# Patient Record
Sex: FEMALE | Race: WHITE | URBAN - METROPOLITAN AREA
[De-identification: names, ages, dates, MRNs, and addresses within clinical notes are randomized per-mention and may not be internally consistent; named-entity substitution may affect disease eponyms.]

---

## 2023-10-05 PROBLEM — M51.37 DEGENERATION OF INTERVERTEBRAL DISC OF LUMBOSACRAL REGION: Status: ACTIVE | Noted: 2023-10-05

## 2023-10-05 PROBLEM — M79.12 MYALGIA OF AUXILIARY MUSCLES, HEAD AND NECK: Status: ACTIVE | Noted: 2023-10-05

## 2023-10-05 PROBLEM — M53.9 MULTILEVEL DEGENERATIVE DISC DISEASE: Status: ACTIVE | Noted: 2023-10-05

## 2023-10-05 PROBLEM — M99.09 SEGMENTAL AND SOMATIC DYSFUNCTION: Status: ACTIVE | Noted: 2023-10-05

## 2023-10-05 PROBLEM — E78.2 MIXED HYPERLIPIDEMIA: Status: ACTIVE | Noted: 2023-10-05

## 2023-10-05 PROBLEM — M54.17 LUMBOSACRAL RADICULITIS: Status: ACTIVE | Noted: 2023-10-05

## 2023-10-05 PROBLEM — M51.379 DEGENERATION OF INTERVERTEBRAL DISC OF LUMBOSACRAL REGION: Status: ACTIVE | Noted: 2023-10-05

## 2023-10-05 RX ORDER — SPIRONOLACTONE 50 MG/1
TABLET, FILM COATED ORAL
COMMUNITY
Start: 2018-01-11

## 2023-10-05 NOTE — PROGRESS NOTES
Subjective   Patient ID: Rose Sutton is a 68 y.o. female who presents October 5, 2023 for neck and low back pain.    No limit-Medicare    Today, the patient rates their degree of pain as a 3 out of 10 on the numeric pain rating scale.     ROSE GUADARRAMA returns for continued treatment of their neck/upper back pain and left hip discomfort. Patient states that she has low back discomfort and right buttock pain that began yesterday while washing her right leg in the shower. She states that until then she had been feeling much better. She states that her stretching routine has helped reduce her symptoms. Denies any associated symptoms or change in medical history since last visit and will follow up PRN.            Objective   Physical Exam  Constitutional:       General: She is not in acute distress.     Appearance: Normal appearance.   HENT:      Head: Normocephalic and atraumatic.   Musculoskeletal:      Cervical back: Decreased range of motion.      Lumbar back: Tenderness present. Decreased range of motion.        Back:    Neurological:      General: No focal deficit present.      Mental Status: She is alert and oriented to person, place, and time.      Cranial Nerves: No dysarthria or facial asymmetry.      Sensory: Sensation is intact.      Motor: Motor function is intact.      Coordination: Coordination is intact.      Gait: Gait is intact.   Psychiatric:         Attention and Perception: Attention normal.         Mood and Affect: Mood normal.         Speech: Speech normal.         Behavior: Behavior is cooperative.         Palpation of the following region(s) revealed:  Cervical: Levator scapulae right, hypertonicity and tenderness.  Cervical paraspinals bilateral, muscular hypertonicity.  Thoracic: Thoracic paraspinals right, hypertonicity and tenderness.  Lumbar: Lumbar paraspinals bilateral, hypertonicity and tenderness.  Gluteal right, hypertonicity and tenderness.        Segmental Joint(s): Segmental joint  dysfunction was assessed with motion palpation and is identified in the following areas:  Cervical : C1 C4 C6 C7  Thoracic : T1, T4, and T8  Lumbopelvic / Sacral SIJ : L5/S1, R SIJ, and L SIJ  Upper / Lower Extremities : L Hip      Assessment/Plan   Today's Treatment Included: Chiropractic manipulation to the Segmental Joint(s) Cervical : C1 C4 C6 C7 Segmental Joint(s) Lumbopelvic/Sacral SIJ : L5/S1, R SIJ, and L SIJ Segmental Joint(s) Thoracic : T1, T4, and T8 Segmental Joints Upper/Lower Extremities : L Hip  Treatment Techniques Used : Pelvic drop table technique, Manual traction, and Low force  Massage Therapy ([id number]): Start time: 8:55 am End time: 9:10 am  1 Units    Soft-tissue mobilization was performed in the following areas:   Cervical paraspinal mm. bilateral and Levator Scap. right  Thoracic Paraspinal mm. right  Lumbar Paraspinal mm. bilateral and Gluteal mm. Glute. Max.  and Glute. Med. right            The patient tolerated today's treatment with little or no additional discomfort and was instructed to contact the office for questions or concerns.

## 2023-10-06 ENCOUNTER — ALLIED HEALTH (OUTPATIENT)
Dept: INTEGRATIVE MEDICINE | Facility: CLINIC | Age: 68
End: 2023-10-06
Payer: MEDICARE

## 2023-10-06 DIAGNOSIS — M54.2 CERVICALGIA: ICD-10-CM

## 2023-10-06 DIAGNOSIS — M99.02 SEGMENTAL AND SOMATIC DYSFUNCTION OF THORACIC REGION: ICD-10-CM

## 2023-10-06 DIAGNOSIS — M99.03 SEGMENTAL AND SOMATIC DYSFUNCTION OF LUMBAR REGION: Primary | ICD-10-CM

## 2023-10-06 DIAGNOSIS — M79.10 MYALGIA: ICD-10-CM

## 2023-10-06 DIAGNOSIS — M99.04 SEGMENTAL AND SOMATIC DYSFUNCTION OF SACRAL REGION: ICD-10-CM

## 2023-10-06 DIAGNOSIS — M54.17 LUMBOSACRAL RADICULITIS: ICD-10-CM

## 2023-10-06 DIAGNOSIS — M99.01 SEGMENTAL AND SOMATIC DYSFUNCTION OF CERVICAL REGION: ICD-10-CM

## 2023-10-06 PROCEDURE — 97124 MASSAGE THERAPY: CPT | Performed by: CHIROPRACTOR

## 2023-10-06 PROCEDURE — 98941 CHIROPRACT MANJ 3-4 REGIONS: CPT | Performed by: CHIROPRACTOR

## 2023-10-09 ENCOUNTER — TELEMEDICINE (OUTPATIENT)
Dept: INTEGRATIVE MEDICINE | Facility: CLINIC | Age: 68
End: 2023-10-09
Payer: MEDICARE

## 2023-10-09 DIAGNOSIS — M19.09 PRIMARY OSTEOARTHRITIS OF OTHER SITE: ICD-10-CM

## 2023-10-09 DIAGNOSIS — M54.59 OTHER LOW BACK PAIN: Primary | ICD-10-CM

## 2023-10-09 DIAGNOSIS — M51.37 DEGENERATION OF INTERVERTEBRAL DISC OF LUMBOSACRAL REGION: ICD-10-CM

## 2023-10-09 DIAGNOSIS — M99.09 SEGMENTAL AND SOMATIC DYSFUNCTION: ICD-10-CM

## 2023-10-09 PROCEDURE — 99202 OFFICE O/P NEW SF 15 MIN: CPT | Performed by: NURSE PRACTITIONER

## 2023-10-09 NOTE — PROGRESS NOTES
Patient presents today for a Medicare acupuncture referral visit.     PMH: other low back pain; degenerative osteoarthritis    When did the pain first start? Has gotten worse and increased in the last year.  What makes it worse? Standing, lifting, bending,. What makes it better? Moving, laying down flat. Current treatments: uses ice/heat, on occasion Advil, no physical therapy, no corticosteroid injections.     Supplementation: Tumeric, bioflex, 2000 units everyday of vit d, nordic naturals fish oil(omega3)     Patient would like an acupuncture referral.     Acupuncture explained and diagnostic codes were reviewed based on patients insurance carrier.

## 2023-10-26 ENCOUNTER — ALLIED HEALTH (OUTPATIENT)
Dept: INTEGRATIVE MEDICINE | Facility: CLINIC | Age: 68
End: 2023-10-26
Payer: MEDICARE

## 2023-10-26 DIAGNOSIS — M54.59 OTHER LOW BACK PAIN: Primary | ICD-10-CM

## 2023-10-26 DIAGNOSIS — M25.551 CHRONIC HIP PAIN, BILATERAL: ICD-10-CM

## 2023-10-26 DIAGNOSIS — M25.552 CHRONIC HIP PAIN, BILATERAL: ICD-10-CM

## 2023-10-26 DIAGNOSIS — G89.29 CHRONIC HIP PAIN, BILATERAL: ICD-10-CM

## 2023-10-26 PROCEDURE — 97810 ACUP 1/> WO ESTIM 1ST 15 MIN: CPT | Performed by: ACUPUNCTURIST

## 2023-10-26 PROCEDURE — 97811 ACUP 1/> W/O ESTIM EA ADD 15: CPT | Performed by: ACUPUNCTURIST

## 2023-10-26 NOTE — PROGRESS NOTES
Acupuncture Visit:     Subjective   Patient ID: Saumya Sutton is a 68 y.o. female who presents for other low back pain    HPI  LBP: back ain is daily R LBP, twisted down on either side is very painful.     LBP is generally 2-3/10 on a daily basis is she is resting. Notes that certain activities will aggravate her back pain, and sleeping in certain positions. Getting out of a sitting position is very painful. Pain goes up to an 8/10 3/week with certain activities, movements takes advil, ice and heat when the pain is bothering her. Notes daily hip pain as well, L hip worse than R hip.    Session Information  Is this acupuncture treatment being billed to the patient's insurance company: Yes  This is visit number: 1  The patient has a total number of visits of: 12  Initial Acupuncture Treatment date: 10/26/23  Last Treatment date: 01/26/24  Name of Insurance Company: Medicare A + B         Review of Systems         Provider reviewed plan for the acupuncture session, precautions and contraindications. Patient/guardian/hospital staff has given consent to treat with full understanding of what to expect during the session. Before acupuncture began, provider explained to the patient to communicate at any time if the procedure was causing discomfort past their tolerance level. Patient agreed to advise acupuncturist. The acupuncturist counseled the patient on the risks of acupuncture treatment including pain, infection, bleeding, and no relief of pain. The patient was positioned comfortably. There was no evidence of infection at the site of needle insertions.    Objective   Physical Exam                             Assessment/Plan

## 2023-10-30 ENCOUNTER — ALLIED HEALTH (OUTPATIENT)
Dept: INTEGRATIVE MEDICINE | Facility: CLINIC | Age: 68
End: 2023-10-30
Payer: MEDICARE

## 2023-10-30 DIAGNOSIS — M54.59 OTHER LOW BACK PAIN: Primary | ICD-10-CM

## 2023-10-30 PROCEDURE — 97811 ACUP 1/> W/O ESTIM EA ADD 15: CPT | Performed by: ACUPUNCTURIST

## 2023-10-30 PROCEDURE — 97810 ACUP 1/> WO ESTIM 1ST 15 MIN: CPT | Performed by: ACUPUNCTURIST

## 2023-10-30 NOTE — PROGRESS NOTES
Subjective   Patient ID: Saumya Sutton is a 68 y.o. female who presents for other low back pain    Session Information  Is this acupuncture treatment being billed to the patient's insurance company: Yes  This is visit number: 1  The patient has a total number of visits of: 12  Initial Acupuncture Treatment date: 10/26/23  Last Treatment date: 01/26/24  Name of Insurance Company: Medicare A + B        HPI    Notes no changes in back pain after last tx.    LBP: back ain is daily R LBP, twisted down on either side is very painful.      LBP is generally 2-3/10 on a daily basis is she is resting. Notes that certain activities will aggravate her back pain, and sleeping in certain positions. Getting out of a sitting position is very painful. Pain goes up to an 8/10 3/week with certain activities, movements takes advil, ice and heat when the pain is bothering her. Notes daily hip pain as well, L hip worse than R hip.    Review of Systems    Review of Systems         Provider reviewed plan for the acupuncture session, precautions and contraindications. Patient/guardian/hospital staff has given consent to treat with full understanding of what to expect during the session. Before acupuncture began, provider explained to the patient to communicate at any time if the procedure was causing discomfort past their tolerance level. Patient agreed to advise acupuncturist. The acupuncturist counseled the patient on the risks of acupuncture treatment including pain, infection, bleeding, and no relief of pain. The patient was positioned comfortably. There was no evidence of infection at the site of needle insertions.    Objective   Physical Exam                             Assessment/Plan

## 2023-10-31 ENCOUNTER — ALLIED HEALTH (OUTPATIENT)
Dept: INTEGRATIVE MEDICINE | Facility: CLINIC | Age: 68
End: 2023-10-31
Payer: MEDICARE

## 2023-10-31 DIAGNOSIS — M99.02 SEGMENTAL AND SOMATIC DYSFUNCTION OF THORACIC REGION: ICD-10-CM

## 2023-10-31 DIAGNOSIS — M99.04 SEGMENTAL AND SOMATIC DYSFUNCTION OF SACRAL REGION: ICD-10-CM

## 2023-10-31 DIAGNOSIS — G89.29 CHRONIC HIP PAIN, BILATERAL: ICD-10-CM

## 2023-10-31 DIAGNOSIS — M99.01 SEGMENTAL AND SOMATIC DYSFUNCTION OF CERVICAL REGION: Primary | ICD-10-CM

## 2023-10-31 DIAGNOSIS — M25.552 CHRONIC HIP PAIN, BILATERAL: ICD-10-CM

## 2023-10-31 DIAGNOSIS — M53.9 MULTILEVEL DEGENERATIVE DISC DISEASE: ICD-10-CM

## 2023-10-31 DIAGNOSIS — M25.551 CHRONIC HIP PAIN, BILATERAL: ICD-10-CM

## 2023-10-31 DIAGNOSIS — M54.2 CERVICALGIA: ICD-10-CM

## 2023-10-31 PROCEDURE — 98941 CHIROPRACT MANJ 3-4 REGIONS: CPT | Performed by: CHIROPRACTOR

## 2023-10-31 ASSESSMENT — PAIN SCALES - GENERAL: PAINLEVEL: 0-NO PAIN

## 2023-10-31 NOTE — PROGRESS NOTES
"Saumya Nicole is here today for treatment of back and TMJ pain.  Her TMJ pain felt better several days after her last treatment.  She has a \"spot\" in her low back that feels tight and restricts her movement as she is bending left.  She started acupuncture which she feels has helped, and will give it 6-8 sessions and evaluate the results. She feels stiff and tight more than pain.  "

## 2023-11-15 ENCOUNTER — ALLIED HEALTH (OUTPATIENT)
Dept: INTEGRATIVE MEDICINE | Facility: CLINIC | Age: 68
End: 2023-11-15
Payer: MEDICARE

## 2023-11-15 NOTE — PROGRESS NOTES
"Subjective   Patient ID: Saumya Sutton is a 68 y.o. female who presents today for the examination and treatment of pain involving their  chief complaint(s) of Back Pain and Temporomandibular Joint Pain.    HPI   Saumya Nicole is here today for treatment of back and TMJ pain.  Her TMJ pain felt better several days after her last treatment.  She has a \"spot\" in her low back that feels tight and restricts her movement as she is bending left.  She started acupuncture which she feels has helped, and will give it 6-8 sessions and evaluate the results. She feels stiff and tight more than pain           Review of Systems    Objective   Physical Exam                                                 Assessment/Plan   Plan:  Today's Treatment Included: Chiropractic manipulation to the Segmental Joint(s) Cervical : C5 C6 Segmental Joint(s) Lumbopelvic/Sacral SIJ : L4, L5, and L5/S1 Segmental Joint(s) Thoracic : T3, T5, T6, and T7   Treatment Techniques Used : Diversified CMT, Pelvic blocking technique, Low velocity, Low amplitude, and Other: strain counter strain  Pin and stretch  Ischemic compression  Soft-Tissue manipulation  Massage Therapy ([id number]): Start time: 1:50  pm End time: 2:05  pm  1 Units      Treatment Plan : The patient and I discussed the risks and benefits of Chiropractic Care.  Based on the patient's subjective complaints along with the examination findings, it is advised that a course of Chiropractic Treatment by initiated in the form of:   as needed for symptom management  Consent for care was given both written and orally by the patient.  The patient tolerated today's treatment with little or no additional discomfort and was instructed to contact the office for questions or concerns.   Follow up as scheduled.    "

## 2023-11-15 NOTE — PROGRESS NOTES
Acupuncture Visit:     Subjective   Patient ID: Saumya Sutton is a 68 y.o. female who presents for No chief complaint on file.    Patient ID: Saumya Sutton is a 68 y.o. female who presents for other low back pain     Session Information  Is this acupuncture treatment being billed to the patient's insurance company: Yes  The patient has a total number of visits of: 12  Initial Acupuncture Treatment date: 10/26/23  Last Treatment date: 01/26/24  Name of Insurance Company: Medicare A + B        HPI     Notes no changes in back pain after last tx.     LBP: back ain is daily R LBP, twisted down on either side is very painful.      LBP is generally 2-3/10 on a daily basis is she is resting. Notes that certain activities will aggravate her back pain, and sleeping in certain positions. Getting out of a sitting position is very painful. Pain goes up to an 8/10 3/week with certain activities, movements takes advil, ice and heat when the pain is bothering her. Notes daily hip pain as well, L hip worse than R hip.     Review of Systems     Review of Systems        Provider reviewed plan for the acupuncture session, precautions and contraindications. Patient/guardian/hospital staff has given consent to treat with full understanding of what to expect during the session. Before acupuncture began, provider explained to the patient to communicate at any time if the procedure was causing discomfort past their tolerance level. Patient agreed to advise acupuncturist. The acupuncturist counseled the patient on the risks of acupuncture treatment including pain, infection, bleeding, and no relief of pain. The patient was positioned comfortably. There was no evidence of infection at the site of needle insertions.                   Session Information  Is this acupuncture treatment being billed to the patient's insurance company: Yes  This is visit number: 3  The patient has a total number of visits of: 12  Initial Acupuncture Treatment  date: 10/26/23  Last Treatment date: 01/26/24  Name of Insurance Company: Medicare A + B 90 days ending 1/26/24  Name of Supervising Physician: Nehal Zarate  Visit Type: New patient  Review of Systems: back pain         Review of Systems         Provider reviewed plan for the acupuncture session, precautions and contraindications. Patient/guardian/hospital staff has given consent to treat with full understanding of what to expect during the session. Before acupuncture began, provider explained to the patient to communicate at any time if the procedure was causing discomfort past their tolerance level. Patient agreed to advise acupuncturist. The acupuncturist counseled the patient on the risks of acupuncture treatment including pain, infection, bleeding, and no relief of pain. The patient was positioned comfortably. There was no evidence of infection at the site of needle insertions.    Objective   Physical Exam              Acupuncture Treatment  Patient Position: Supine on a table  Acupuncture Needling: Yes  Needle Guage: 36 guage /.20/ Blue seirin, 32 guage /.25/ Purple seirin  Body Points: With retention  Body Points - Left: SP9 1.5 SP6 4 - 88.01 88.02 88.03  Body Points - Bilateral: DU24 LI4/LR3 LR8 SP10 ST36  Body Points - Right: 88.12 88.13 88.14  Electroacupuncture Used: No  Needle Count In: 21  Needle Count Out: 21  Needle Retention Time (min): 40 minutes  Total Face to Face Time (min): 25 minutes              Assessment/Plan

## 2023-11-21 ENCOUNTER — ALLIED HEALTH (OUTPATIENT)
Dept: INTEGRATIVE MEDICINE | Facility: CLINIC | Age: 68
End: 2023-11-21
Payer: MEDICARE

## 2023-11-21 DIAGNOSIS — M54.59 OTHER LOW BACK PAIN: Primary | ICD-10-CM

## 2023-11-21 PROCEDURE — 97811 ACUP 1/> W/O ESTIM EA ADD 15: CPT | Performed by: ACUPUNCTURIST

## 2023-11-21 PROCEDURE — 97810 ACUP 1/> WO ESTIM 1ST 15 MIN: CPT | Performed by: ACUPUNCTURIST

## 2023-11-21 NOTE — PROGRESS NOTES
Acupuncture Visit:     Subjective   Patient ID: Saumya Sutton is a 68 y.o. female who presents for No chief complaint on file.    Patient ID: Saumya Sutton is a 68 y.o. female who presents for other low back pain     Session Information  Is this acupuncture treatment being billed to the patient's insurance company: Yes  The patient has a total number of visits of: 12  Initial Acupuncture Treatment date: 10/26/23  Last Treatment date: 01/26/24  Name of Insurance Company: Medicare A + B        HPI     Notes no changes in back pain after last tx.     LBP: back ain is daily R LBP, twisted down on either side is very painful.      LBP is generally 2-3/10 on a daily basis is she is resting. Notes that certain activities will aggravate her back pain, and sleeping in certain positions. Getting out of a sitting position is very painful. Pain goes up to an 8/10 3/week with certain activities, movements takes advil, ice and heat when the pain is bothering her. Notes daily hip pain as well, L hip worse than R hip.     Review of Systems     Review of Systems        Provider reviewed plan for the acupuncture session, precautions and contraindications. Patient/guardian/hospital staff has given consent to treat with full understanding of what to expect during the session. Before acupuncture began, provider explained to the patient to communicate at any time if the procedure was causing discomfort past their tolerance level. Patient agreed to advise acupuncturist. The acupuncturist counseled the patient on the risks of acupuncture treatment including pain, infection, bleeding, and no relief of pain. The patient was positioned comfortably. There was no evidence of infection at the site of needle insertions.                   Session Information  Is this acupuncture treatment being billed to the patient's insurance company: Yes  This is visit number: 4  The patient has a total number of visits of: 12  Initial Acupuncture Treatment  date: 10/26/23  Last Treatment date: 01/26/24  Name of Insurance Company: Medicare A + B 90 days ending 1/26/24  Name of Supervising Physician: Nehal Zarate  Visit Type: New patient  Review of Systems: back pain         Review of Systems         Provider reviewed plan for the acupuncture session, precautions and contraindications. Patient/guardian/hospital staff has given consent to treat with full understanding of what to expect during the session. Before acupuncture began, provider explained to the patient to communicate at any time if the procedure was causing discomfort past their tolerance level. Patient agreed to advise acupuncturist. The acupuncturist counseled the patient on the risks of acupuncture treatment including pain, infection, bleeding, and no relief of pain. The patient was positioned comfortably. There was no evidence of infection at the site of needle insertions.    Objective   Physical Exam              Acupuncture Treatment  Patient Position: Lateral recumbent on a table  Acupuncture Needling: Yes  Needle Guage: 36 guage /.20/ Blue seirin, 32 guage /.25/ Purple seirin  Body Points - Left: KI3 KI6  Body Points - Bilateral: BL23 BL24 BL25 BL26 BL27 BL28  Body Points - Right: BL62 BL60 - 5 ann arm  Needle Count In: 25  Needle Count Out: 25              Assessment/Plan

## 2023-11-28 ENCOUNTER — APPOINTMENT (OUTPATIENT)
Dept: INTEGRATIVE MEDICINE | Facility: CLINIC | Age: 68
End: 2023-11-28
Payer: MEDICARE

## 2023-11-28 ENCOUNTER — APPOINTMENT (OUTPATIENT)
Dept: PHYSICAL THERAPY | Facility: CLINIC | Age: 68
End: 2023-11-28
Payer: MEDICARE

## 2023-12-05 ENCOUNTER — APPOINTMENT (OUTPATIENT)
Dept: INTEGRATIVE MEDICINE | Facility: CLINIC | Age: 68
End: 2023-12-05
Payer: MEDICARE

## 2023-12-12 ENCOUNTER — ALLIED HEALTH (OUTPATIENT)
Dept: INTEGRATIVE MEDICINE | Facility: CLINIC | Age: 68
End: 2023-12-12
Payer: MEDICARE

## 2023-12-12 DIAGNOSIS — M54.59 OTHER LOW BACK PAIN: Primary | ICD-10-CM

## 2023-12-12 PROCEDURE — 97810 ACUP 1/> WO ESTIM 1ST 15 MIN: CPT | Performed by: ACUPUNCTURIST

## 2023-12-12 PROCEDURE — 97811 ACUP 1/> W/O ESTIM EA ADD 15: CPT | Performed by: ACUPUNCTURIST

## 2023-12-12 NOTE — PROGRESS NOTES
Acupuncture Visit:     Subjective   Patient ID: Saumya Sutton is a 68 y.o. female who presents for No chief complaint on file.    Patient ID: Saumya Sutton is a 68 y.o. female who presents for other low back pain     Session Information  Is this acupuncture treatment being billed to the patient's insurance company: Yes  The patient has a total number of visits of: 12  Initial Acupuncture Treatment date: 10/26/23  Last Treatment date: 01/26/24  Name of Insurance Company: Medicare A + B        HPI  Back pain has felt much improved since her last tx, pain is reduced.    Notes no changes in back pain after last tx.     LBP: back ain is daily R LBP, twisted down on either side is very painful.      LBP is generally 2-3/10 on a daily basis is she is resting. Notes that certain activities will aggravate her back pain, and sleeping in certain positions. Getting out of a sitting position is very painful. Pain goes up to an 8/10 3/week with certain activities, movements takes advil, ice and heat when the pain is bothering her. Notes daily hip pain as well, L hip worse than R hip.     Review of Systems     Review of Systems        Provider reviewed plan for the acupuncture session, precautions and contraindications. Patient/guardian/hospital staff has given consent to treat with full understanding of what to expect during the session. Before acupuncture began, provider explained to the patient to communicate at any time if the procedure was causing discomfort past their tolerance level. Patient agreed to advise acupuncturist. The acupuncturist counseled the patient on the risks of acupuncture treatment including pain, infection, bleeding, and no relief of pain. The patient was positioned comfortably. There was no evidence of infection at the site of needle insertions.                   Session Information  Is this acupuncture treatment being billed to the patient's insurance company: Yes  This is visit number: 5  The  patient has a total number of visits of: 12  Initial Acupuncture Treatment date: 10/26/23  Last Treatment date: 01/26/24  Name of Insurance Company: Medicare A + B 90 days ending 1/26/24  Name of Supervising Physician: Nehal Zarate  Visit Type: Follow-up visit  Description of present complaint: Chronic pain  Review of Systems: back pain         Review of Systems         Provider reviewed plan for the acupuncture session, precautions and contraindications. Patient/guardian/hospital staff has given consent to treat with full understanding of what to expect during the session. Before acupuncture began, provider explained to the patient to communicate at any time if the procedure was causing discomfort past their tolerance level. Patient agreed to advise acupuncturist. The acupuncturist counseled the patient on the risks of acupuncture treatment including pain, infection, bleeding, and no relief of pain. The patient was positioned comfortably. There was no evidence of infection at the site of needle insertions.    Objective   Physical Exam              Acupuncture Treatment  Patient Position: Lateral recumbent on a table  Acupuncture Needling: Yes  Needle Guage: 32 guage /.25/ Purple seirin  Body Points: With retention  Body Points - Left: SP3 - 22.06 SI3 - ST36 GB34  Body Points - Bilateral: BL23 BL24 BL25 BL26 BL27 BL28  Body Points - Right: BL62 BL60 - 3 ann arm - DB/LG  Needle Count In: 24  Needle Count Out: 24  Needle Retention Time (min): 35 minutes  Total Face to Face Time (min): 25 minutes              Assessment/Plan

## 2023-12-19 ENCOUNTER — ALLIED HEALTH (OUTPATIENT)
Dept: INTEGRATIVE MEDICINE | Facility: CLINIC | Age: 68
End: 2023-12-19
Payer: MEDICARE

## 2023-12-19 DIAGNOSIS — M99.01 SEGMENTAL AND SOMATIC DYSFUNCTION OF CERVICAL REGION: ICD-10-CM

## 2023-12-19 DIAGNOSIS — M99.02 SEGMENTAL AND SOMATIC DYSFUNCTION OF THORACIC REGION: ICD-10-CM

## 2023-12-19 DIAGNOSIS — E78.2 MIXED HYPERLIPIDEMIA: Primary | ICD-10-CM

## 2023-12-19 DIAGNOSIS — M79.10 MYALGIA: ICD-10-CM

## 2023-12-19 DIAGNOSIS — M99.04 SEGMENTAL AND SOMATIC DYSFUNCTION OF SACRAL REGION: ICD-10-CM

## 2023-12-19 DIAGNOSIS — M53.9 MULTILEVEL DEGENERATIVE DISC DISEASE: ICD-10-CM

## 2023-12-19 DIAGNOSIS — M54.2 CERVICALGIA: ICD-10-CM

## 2023-12-19 ASSESSMENT — PAIN SCALES - GENERAL: PAINLEVEL: 2

## 2023-12-19 NOTE — PROGRESS NOTES
Saumya Nicole is here today with back and neck  pain    Patient ID: Saumya Sutton is a 68 y.o. female who presents today for the examination and treatment of pain involving their  chief complaint(s) of Back Pain and Temporomandibular Joint Pain.     HPI -   She feels stiff and tight more than pain- still finding acupuncture to be helpful                 Review of Systems- reviewed            Objective   Physical Exam              Assessment/Plan   Plan:  Today's Treatment Included: Chiropractic manipulation to the Segmental Joint(s) Cervical : C5 C6 Segmental Joint(s) Lumbopelvic/Sacral SIJ : L4, L5, and L5/S1 Segmental Joint(s) Thoracic : T3, T5, T6, and T7   Treatment Techniques Used : Diversified CMT, Pelvic blocking technique, Low velocity, Low amplitude, and Other: strain counter strain  Pin and stretch  Ischemic compression  Soft-Tissue manipulation        Treatment Plan : The patient and I discussed the risks and benefits of Chiropractic Care.  Based on the patient's subjective complaints along with the examination findings, it is advised that a course of Chiropractic Treatment by initiated in the form of:   as needed for symptom management  Consent for care was given both written and orally by the patient.  The patient tolerated today's treatment with little or no additional discomfort and was instructed to contact the office for questions or concerns.   Follow up as scheduled.    Ordered place for Lipid Panel at the patients's request.

## 2024-01-03 ENCOUNTER — APPOINTMENT (OUTPATIENT)
Dept: INTEGRATIVE MEDICINE | Facility: CLINIC | Age: 69
End: 2024-01-03
Payer: MEDICARE

## 2024-01-05 ENCOUNTER — LAB (OUTPATIENT)
Dept: LAB | Facility: LAB | Age: 69
End: 2024-01-05
Payer: MEDICARE

## 2024-01-05 DIAGNOSIS — E78.2 MIXED HYPERLIPIDEMIA: ICD-10-CM

## 2024-01-05 LAB
CHOLEST SERPL-MCNC: 263 MG/DL (ref 0–199)
CHOLESTEROL/HDL RATIO: 3.7
HDLC SERPL-MCNC: 71.2 MG/DL
LDLC SERPL CALC-MCNC: 175 MG/DL
NON HDL CHOLESTEROL: 192 MG/DL (ref 0–149)
TRIGL SERPL-MCNC: 84 MG/DL (ref 0–149)
VLDL: 17 MG/DL (ref 0–40)

## 2024-01-05 PROCEDURE — 36415 COLL VENOUS BLD VENIPUNCTURE: CPT

## 2024-01-05 PROCEDURE — 80061 LIPID PANEL: CPT

## 2024-01-06 PROBLEM — M54.2 CERVICALGIA: Status: ACTIVE | Noted: 2024-01-06

## 2024-01-06 PROBLEM — M99.04 SEGMENTAL AND SOMATIC DYSFUNCTION OF SACRAL REGION: Status: ACTIVE | Noted: 2024-01-06

## 2024-01-06 PROBLEM — M79.10 MYALGIA: Status: ACTIVE | Noted: 2024-01-06

## 2024-01-06 PROBLEM — M99.01 SEGMENTAL AND SOMATIC DYSFUNCTION OF CERVICAL REGION: Status: ACTIVE | Noted: 2024-01-06

## 2024-01-06 PROBLEM — M99.02 SEGMENTAL AND SOMATIC DYSFUNCTION OF THORACIC REGION: Status: ACTIVE | Noted: 2024-01-06

## 2024-01-10 ENCOUNTER — ALLIED HEALTH (OUTPATIENT)
Dept: INTEGRATIVE MEDICINE | Facility: CLINIC | Age: 69
End: 2024-01-10
Payer: MEDICARE

## 2024-01-10 DIAGNOSIS — M54.59 OTHER LOW BACK PAIN: Primary | ICD-10-CM

## 2024-01-10 PROCEDURE — 97811 ACUP 1/> W/O ESTIM EA ADD 15: CPT | Performed by: ACUPUNCTURIST

## 2024-01-10 PROCEDURE — 97810 ACUP 1/> WO ESTIM 1ST 15 MIN: CPT | Performed by: ACUPUNCTURIST

## 2024-01-10 NOTE — PROGRESS NOTES
Acupuncture Visit:     Subjective   Patient ID: Saumya Sutton is a 69 y.o. female who presents for No chief complaint on file.    Patient ID: Saumya Sutton is a 68 y.o. female who presents for other low back pain     Session Information  Is this acupuncture treatment being billed to the patient's insurance company: Yes  The patient has a total number of visits of: 12  Initial Acupuncture Treatment date: 10/26/23  Last Treatment date: 01/26/24  Name of Insurance Company: Medicare A + B        HPI  Back pain has felt much improved since her last tx, pain is reduced, but has spiked up the past few days again, pain has gradually came back. Notes pain is LB/sacrum hips and groin area, with the hips and groin are being the worst pain.     LBP: back ain is daily R LBP, twisted down on either side is very painful.      LBP is generally 2-3/10 on a daily basis is she is resting. Notes that certain activities will aggravate her back pain, and sleeping in certain positions. Getting out of a sitting position is very painful. Pain goes up to an 8/10 3/week with certain activities, movements takes advil, ice and heat when the pain is bothering her. Notes daily hip pain as well, L hip worse than R hip.     Review of Systems     Review of Systems        Provider reviewed plan for the acupuncture session, precautions and contraindications. Patient/guardian/hospital staff has given consent to treat with full understanding of what to expect during the session. Before acupuncture began, provider explained to the patient to communicate at any time if the procedure was causing discomfort past their tolerance level. Patient agreed to advise acupuncturist. The acupuncturist counseled the patient on the risks of acupuncture treatment including pain, infection, bleeding, and no relief of pain. The patient was positioned comfortably. There was no evidence of infection at the site of needle insertions.                   Session  Information  Is this acupuncture treatment being billed to the patient's insurance company: Yes  This is visit number: 6  The patient has a total number of visits of: 12  Initial Acupuncture Treatment date: 10/26/23  Last Treatment date: 01/26/24  Name of Insurance Company: Medicare A + B 90 days ending 1/26/24  Name of Supervising Physician: Nehal Zarate  Visit Type: Follow-up visit  Description of present complaint: Chronic pain         Review of Systems         Provider reviewed plan for the acupuncture session, precautions and contraindications. Patient/guardian/hospital staff has given consent to treat with full understanding of what to expect during the session. Before acupuncture began, provider explained to the patient to communicate at any time if the procedure was causing discomfort past their tolerance level. Patient agreed to advise acupuncturist. The acupuncturist counseled the patient on the risks of acupuncture treatment including pain, infection, bleeding, and no relief of pain. The patient was positioned comfortably. There was no evidence of infection at the site of needle insertions.    Objective   Physical Exam              Acupuncture Treatment  Patient Position: Lateral recumbent on a table  Acupuncture Needling: Yes  Needle Guage: 32 guage /.25/ Purple seirin  Body Points: With retention  Body Points - Left: SJ5 - GB34 BL60 GB41  Body Points - Bilateral: DU20 - BL17 BL18 BL23 BL25 BL26 BL27 BL28 BL29  Electroacupuncture Used: No  Needle Count In: 21  Needle Count Out: 21  Needle Retention Time (min): 30 minutes  Total Face to Face Time (min): 30 minutes              Assessment/Plan

## 2024-01-17 ENCOUNTER — APPOINTMENT (OUTPATIENT)
Dept: INTEGRATIVE MEDICINE | Facility: CLINIC | Age: 69
End: 2024-01-17
Payer: MEDICARE

## 2024-01-18 ENCOUNTER — ALLIED HEALTH (OUTPATIENT)
Dept: INTEGRATIVE MEDICINE | Facility: CLINIC | Age: 69
End: 2024-01-18
Payer: MEDICARE

## 2024-01-18 DIAGNOSIS — M54.59 OTHER LOW BACK PAIN: Primary | ICD-10-CM

## 2024-01-18 PROCEDURE — 97810 ACUP 1/> WO ESTIM 1ST 15 MIN: CPT | Performed by: ACUPUNCTURIST

## 2024-01-18 PROCEDURE — 97811 ACUP 1/> W/O ESTIM EA ADD 15: CPT | Performed by: ACUPUNCTURIST

## 2024-01-18 NOTE — PROGRESS NOTES
Acupuncture Visit:     Subjective   Patient ID: Saumya Sutton is a 69 y.o. female who presents for No chief complaint on file.    Patient ID: Saumya Sutton is a 68 y.o. female who presents for other low back pain     Session Information  Is this acupuncture treatment being billed to the patient's insurance company: Yes  The patient has a total number of visits of: 12  Initial Acupuncture Treatment date: 10/26/23  Last Treatment date: 01/26/24  Name of Insurance Company: Medicare A + B        HPI  Back pain has felt much improved since her last tx, pain is reduced, but has spiked up the past few days again, pain has gradually came back. Notes pain is LB/sacrum hips and groin area, with the hips and groin are being the worst pain.     LBP: back ain is daily R LBP, twisted down on either side is very painful.      LBP is generally 2-3/10 on a daily basis is she is resting. Notes that certain activities will aggravate her back pain, and sleeping in certain positions. Getting out of a sitting position is very painful. Pain goes up to an 8/10 3/week with certain activities, movements takes advil, ice and heat when the pain is bothering her. Notes daily hip pain as well, L hip worse than R hip.     Review of Systems     Review of Systems        Provider reviewed plan for the acupuncture session, precautions and contraindications. Patient/guardian/hospital staff has given consent to treat with full understanding of what to expect during the session. Before acupuncture began, provider explained to the patient to communicate at any time if the procedure was causing discomfort past their tolerance level. Patient agreed to advise acupuncturist. The acupuncturist counseled the patient on the risks of acupuncture treatment including pain, infection, bleeding, and no relief of pain. The patient was positioned comfortably. There was no evidence of infection at the site of needle insertions.                   Session  Information  Is this acupuncture treatment being billed to the patient's insurance company: Yes  This is visit number: 7  The patient has a total number of visits of: 12  Initial Acupuncture Treatment date: 10/26/23  Last Treatment date: 01/26/24  Name of Insurance Company: Medicare A + B 90 days ending 1/26/24  Name of Supervising Physician: Nehal Zarate  Visit Type: Follow-up visit  Description of present complaint: Chronic pain  Review of Systems: back pain         Review of Systems         Provider reviewed plan for the acupuncture session, precautions and contraindications. Patient/guardian/hospital staff has given consent to treat with full understanding of what to expect during the session. Before acupuncture began, provider explained to the patient to communicate at any time if the procedure was causing discomfort past their tolerance level. Patient agreed to advise acupuncturist. The acupuncturist counseled the patient on the risks of acupuncture treatment including pain, infection, bleeding, and no relief of pain. The patient was positioned comfortably. There was no evidence of infection at the site of needle insertions.    Objective   Physical Exam              Acupuncture Treatment  Patient Position: Lateral recumbent on a table  Acupuncture Needling: Yes  Needle Guage: 32 guage /.25/ Purple seirin  Body Points: With retention  Body Points - Left: 3 ann L  hip - GB21 GB20 1 ann ST7 - KI6 KI3  Body Points - Bilateral: DU20 BL17 BL18 BL20 BL23 BL24 BL25 BL27 BL28 BL29  Body Points - Right: BL62 BL60 ST36 GB34  Other Techniques Utilized: TDP Lamp  TDP Lamp Descripton: 4 moxa sprays - mid back/LB  Needle Count In: 32  Needle Count Out: 32  Needle Retention Time (min): 35 minutes  Total Face to Face Time (min): 30 minutes              Assessment/Plan

## 2024-01-23 ENCOUNTER — APPOINTMENT (OUTPATIENT)
Dept: INTEGRATIVE MEDICINE | Facility: CLINIC | Age: 69
End: 2024-01-23
Payer: MEDICARE

## 2024-01-24 ENCOUNTER — ALLIED HEALTH (OUTPATIENT)
Dept: INTEGRATIVE MEDICINE | Facility: CLINIC | Age: 69
End: 2024-01-24
Payer: MEDICARE

## 2024-01-24 DIAGNOSIS — M54.59 OTHER LOW BACK PAIN: Primary | ICD-10-CM

## 2024-01-24 PROCEDURE — 97810 ACUP 1/> WO ESTIM 1ST 15 MIN: CPT | Performed by: ACUPUNCTURIST

## 2024-01-24 PROCEDURE — 97811 ACUP 1/> W/O ESTIM EA ADD 15: CPT | Performed by: ACUPUNCTURIST

## 2024-01-24 NOTE — PROGRESS NOTES
Acupuncture Visit:     Subjective   Patient ID: Saumya Sutton is a 69 y.o. female who presents for No chief complaint on file.    Patient ID: Saumya Sutton is a 68 y.o. female who presents for other low back pain    LBP - much improved for 5 days post tx. Notes LBP, mid back pain and jaw pain. Overall back pain has improved with acupuncture      Session Information  Is this acupuncture treatment being billed to the patient's insurance company: Yes  The patient has a total number of visits of: 12  Initial Acupuncture Treatment date: 10/26/23  Last Treatment date: 01/26/24  Name of Insurance Company: Medicare A + B    Initial:  LBP is generally 2-3/10 on a daily basis is she is resting. Notes that certain activities will aggravate her back pain, and sleeping in certain positions. Getting out of a sitting position is very painful. Pain goes up to an 8/10 3/week with certain activities, movements takes advil, ice and heat when the pain is bothering her. Notes daily hip pain as well, L hip worse than R hip.     Provider reviewed plan for the acupuncture session, precautions and contraindications. Patient/guardian/hospital staff has given consent to treat with full understanding of what to expect during the session. Before acupuncture began, provider explained to the patient to communicate at any time if the procedure was causing discomfort past their tolerance level. Patient agreed to advise acupuncturist. The acupuncturist counseled the patient on the risks of acupuncture treatment including pain, infection, bleeding, and no relief of pain. The patient was positioned comfortably. There was no evidence of infection at the site of needle insertions.                   Session Information  Is this acupuncture treatment being billed to the patient's insurance company: Yes  This is visit number: 8  The patient has a total number of visits of: 12  Initial Acupuncture Treatment date: 10/26/23  Last Treatment date:  01/26/24  Name of Insurance Company: Medicare A + B 90 days ending 1/26/24  Name of Supervising Physician: Nehal Zarate  Visit Type: Follow-up visit  Description of present complaint: Chronic pain         Review of Systems         Provider reviewed plan for the acupuncture session, precautions and contraindications. Patient/guardian/hospital staff has given consent to treat with full understanding of what to expect during the session. Before acupuncture began, provider explained to the patient to communicate at any time if the procedure was causing discomfort past their tolerance level. Patient agreed to advise acupuncturist. The acupuncturist counseled the patient on the risks of acupuncture treatment including pain, infection, bleeding, and no relief of pain. The patient was positioned comfortably. There was no evidence of infection at the site of needle insertions.    Objective   Physical Exam              Acupuncture Treatment  Patient Position: Lateral recumbent on a table  Acupuncture Needling: Yes  Needle Guage: 32 guage /.25/ Purple seirin  Body Points: With retention  Body Points - Left: GB31 GB34 BL62 BL60 - ST5 ST6 ST7  Body Points - Bilateral: BL14 BL15 BL16 BL17 BL23 BL24 BL25 BL26 BL27 BL28  Other Techniques Utilized: TDP Lamp  TDP Lamp Descripton: 4 moxa sprays - mid back/BL 17 BL16 area  Needle Count In: 27  Needle Count Out: 27              Assessment/Plan

## 2024-01-25 ENCOUNTER — APPOINTMENT (OUTPATIENT)
Dept: INTEGRATIVE MEDICINE | Facility: CLINIC | Age: 69
End: 2024-01-25
Payer: MEDICARE

## 2024-01-30 ENCOUNTER — ALLIED HEALTH (OUTPATIENT)
Dept: INTEGRATIVE MEDICINE | Facility: CLINIC | Age: 69
End: 2024-01-30
Payer: MEDICARE

## 2024-01-30 DIAGNOSIS — M99.01 SEGMENTAL AND SOMATIC DYSFUNCTION OF CERVICAL REGION: Primary | ICD-10-CM

## 2024-01-30 DIAGNOSIS — M54.2 CERVICALGIA: ICD-10-CM

## 2024-01-30 DIAGNOSIS — M99.02 SEGMENTAL AND SOMATIC DYSFUNCTION OF THORACIC REGION: ICD-10-CM

## 2024-01-30 DIAGNOSIS — M25.551 CHRONIC HIP PAIN, BILATERAL: ICD-10-CM

## 2024-01-30 DIAGNOSIS — M25.552 CHRONIC HIP PAIN, BILATERAL: ICD-10-CM

## 2024-01-30 DIAGNOSIS — M53.9 MULTILEVEL DEGENERATIVE DISC DISEASE: ICD-10-CM

## 2024-01-30 DIAGNOSIS — M99.04 SEGMENTAL AND SOMATIC DYSFUNCTION OF SACRAL REGION: ICD-10-CM

## 2024-01-30 DIAGNOSIS — G89.29 CHRONIC HIP PAIN, BILATERAL: ICD-10-CM

## 2024-01-30 PROCEDURE — 98941 CHIROPRACT MANJ 3-4 REGIONS: CPT | Performed by: CHIROPRACTOR

## 2024-01-30 ASSESSMENT — PAIN SCALES - GENERAL: PAINLEVEL: 2

## 2024-01-30 NOTE — PROGRESS NOTES
"  Patient ID: Saumya Sutton is a 68 y.o. female who presents today for the examination and treatment of pain involving their  chief complaint(s) of Back Pain and Temporomandibular Joint Pain.     HPI -   Saumya Nicole is here today for treatment of L leg, knee pain, along with R arm pain.  She has been getting acupuncture treatments for R bicep pain, which has improved, but the pain seems to have \"moved down to the forearm\".                  Review of Systems- reviewed            Objective    hysical Exam  Neuro- Vascular appears to be intact.  The patient is alert and oriented to person, place, and time with normal speech.  Cranial nerves are intact.   Cerebellar function is intact.   Memory appears to be normal and thought process is intact.   No gait abnormalities nor antalgic posture are appreciated.  No muscular clonus noted.   Otherwise no material changes from my initial exam           Assessment/Plan   Plan:  Today's Treatment Included: Chiropractic manipulation to the Segmental Joint(s) Cervical : C5 C6 Segmental Joint(s) Lumbopelvic/Sacral SIJ : L4, L5, and L5/S1 Segmental Joint(s) Thoracic : T3, T5, T6, and T7   Treatment Techniques Used : Diversified CMT, Pelvic blocking technique, Low velocity, Low amplitude, and Other: strain counter strain  Pin and stretch  Ischemic compression  Soft-Tissue manipulation        Treatment Plan : The patient and I discussed the risks and benefits of Chiropractic Care.  Based on the patient's subjective complaints along with the examination findings, it is advised that a course of Chiropractic Treatment by initiated in the form of:   as needed for symptom management  Consent for care was given both written and orally by the patient.  The patient tolerated today's treatment with little or no additional discomfort and was instructed to contact the office for questions or concerns.   Follow up as scheduled.     "

## 2024-01-31 ENCOUNTER — ALLIED HEALTH (OUTPATIENT)
Dept: INTEGRATIVE MEDICINE | Facility: CLINIC | Age: 69
End: 2024-01-31
Payer: MEDICARE

## 2024-01-31 DIAGNOSIS — M54.59 OTHER LOW BACK PAIN: Primary | ICD-10-CM

## 2024-01-31 PROCEDURE — 97810 ACUP 1/> WO ESTIM 1ST 15 MIN: CPT | Performed by: INTERNAL MEDICINE

## 2024-01-31 PROCEDURE — 97811 ACUP 1/> W/O ESTIM EA ADD 15: CPT | Performed by: INTERNAL MEDICINE

## 2024-01-31 NOTE — PROGRESS NOTES
Acupuncture Visit:     Subjective   Patient ID: Saumya Sutton is a 69 y.o. female who presents for No chief complaint on file.    Patient ID: Saumya Sutton is a 68 y.o. female who presents for other low back pain    LBP - much improved for 5 days post tx. Notes LBP, mid back pain. States flare in R hip/down R leg post tx, from lying on R side. Chiro tx yesterday helped reduce that pain.    Session Information  Is this acupuncture treatment being billed to the patient's insurance company: Yes  The patient has a total number of visits of: 12  Initial Acupuncture Treatment date: 10/26/23  Last Treatment date: 01/26/24  Name of Insurance Company: Medicare A + B    Initial:  LBP is generally 2-3/10 on a daily basis is she is resting. Notes that certain activities will aggravate her back pain, and sleeping in certain positions. Getting out of a sitting position is very painful. Pain goes up to an 8/10 3/week with certain activities, movements takes advil, ice and heat when the pain is bothering her. Notes daily hip pain as well, L hip worse than R hip.     Provider reviewed plan for the acupuncture session, precautions and contraindications. Patient/guardian/hospital staff has given consent to treat with full understanding of what to expect during the session. Before acupuncture began, provider explained to the patient to communicate at any time if the procedure was causing discomfort past their tolerance level. Patient agreed to advise acupuncturist. The acupuncturist counseled the patient on the risks of acupuncture treatment including pain, infection, bleeding, and no relief of pain. The patient was positioned comfortably. There was no evidence of infection at the site of needle insertions.                   Session Information  Is this acupuncture treatment being billed to the patient's insurance company: Yes  This is visit number: 1  The patient has a total number of visits of: 8  Initial Acupuncture Treatment  date: 10/26/23  Name of Insurance Company: Medicare A + B 90 days ending 1/26/24  Name of Supervising Physician: Nehal Zarate  Visit Type: Follow-up visit  Description of present complaint: Chronic pain         Review of Systems         Provider reviewed plan for the acupuncture session, precautions and contraindications. Patient/guardian/hospital staff has given consent to treat with full understanding of what to expect during the session. Before acupuncture began, provider explained to the patient to communicate at any time if the procedure was causing discomfort past their tolerance level. Patient agreed to advise acupuncturist. The acupuncturist counseled the patient on the risks of acupuncture treatment including pain, infection, bleeding, and no relief of pain. The patient was positioned comfortably. There was no evidence of infection at the site of needle insertions.    Objective   Physical Exam              Acupuncture Treatment  Patient Position: Supine on a table  Acupuncture Needling: Yes  Needle Guage: 40 guage /.16/ Red seirin  Body Points: With retention  Body Points - Left: SP9 1.5 SP6 4 - 88.12 88.13 88.14  Body Points - Bilateral: DU28 - SP4 SP10  Body Points - Right: GB21 - 88.09 88.10 88.11  Other Techniques Utilized: TDP Lamp  TDP Lamp Descripton: Moxa - MT points 88.09 set  Needle Count In: 16  Needle Count Out: 16  Needle Retention Time (min): 30 minutes  Total Face to Face Time (min): 30 minutes              Assessment/Plan

## 2024-02-06 ENCOUNTER — ALLIED HEALTH (OUTPATIENT)
Dept: INTEGRATIVE MEDICINE | Facility: CLINIC | Age: 69
End: 2024-02-06
Payer: MEDICARE

## 2024-02-06 DIAGNOSIS — M54.59 OTHER LOW BACK PAIN: Primary | ICD-10-CM

## 2024-02-06 PROCEDURE — 97811 ACUP 1/> W/O ESTIM EA ADD 15: CPT | Performed by: INTERNAL MEDICINE

## 2024-02-06 PROCEDURE — 97810 ACUP 1/> WO ESTIM 1ST 15 MIN: CPT | Performed by: INTERNAL MEDICINE

## 2024-02-06 NOTE — PROGRESS NOTES
Acupuncture Visit:     Subjective   Patient ID: Saumya Sutton is a 69 y.o. female who presents for No chief complaint on file.    Patient ID: Saumya Sutton is a 68 y.o. female who presents for other low back pain    LBP - much improved for 5 days post tx. Notes LBP, mid back pain. Pain gradually came back, not as high as it was prior to tx.    Session Information  Is this acupuncture treatment being billed to the patient's insurance company: Yes  The patient has a total number of visits of: 12  Initial Acupuncture Treatment date: 10/26/23  Last Treatment date: 01/26/24  Name of Insurance Company: Medicare A + B    Initial:  LBP is generally 2-3/10 on a daily basis is she is resting. Notes that certain activities will aggravate her back pain, and sleeping in certain positions. Getting out of a sitting position is very painful. Pain goes up to an 8/10 3/week with certain activities, movements takes advil, ice and heat when the pain is bothering her. Notes daily hip pain as well, L hip worse than R hip.     Provider reviewed plan for the acupuncture session, precautions and contraindications. Patient/guardian/hospital staff has given consent to treat with full understanding of what to expect during the session. Before acupuncture began, provider explained to the patient to communicate at any time if the procedure was causing discomfort past their tolerance level. Patient agreed to advise acupuncturist. The acupuncturist counseled the patient on the risks of acupuncture treatment including pain, infection, bleeding, and no relief of pain. The patient was positioned comfortably. There was no evidence of infection at the site of needle insertions.                   Session Information  Is this acupuncture treatment being billed to the patient's insurance company: Yes  This is visit number: 2  The patient has a total number of visits of: 8  Initial Acupuncture Treatment date: 10/26/23  Last Treatment date:  01/26/24  Name of Insurance Company: Medicare A + B 90 days ending 1/26/24  Name of Supervising Physician: Nehal Zarate  Visit Type: Follow-up visit  Description of present complaint: Chronic pain         Review of Systems         Provider reviewed plan for the acupuncture session, precautions and contraindications. Patient/guardian/hospital staff has given consent to treat with full understanding of what to expect during the session. Before acupuncture began, provider explained to the patient to communicate at any time if the procedure was causing discomfort past their tolerance level. Patient agreed to advise acupuncturist. The acupuncturist counseled the patient on the risks of acupuncture treatment including pain, infection, bleeding, and no relief of pain. The patient was positioned comfortably. There was no evidence of infection at the site of needle insertions.    Objective   Physical Exam              Acupuncture Treatment  Patient Position: Supine on a table  Acupuncture Needling: Yes  Needle Guage: 36 guage /.20/ Blue seirin  Body Points: With retention  Body Points - Left: KI6 - SP9 1.5 SP6 4 - 88.12 88.13 88.14  Body Points - Bilateral: YT PC6/SP4 -  Body Points - Right: 88.09 88.10 88.11  Other Techniques Utilized: TDP Lamp  TDP Lamp Descripton: Moxa - MT points 88.09 set  Needle Count In: 16  Needle Count Out: 16  Needle Retention Time (min): 30 minutes  Total Face to Face Time (min): 30 minutes              Assessment/Plan

## 2024-02-08 ENCOUNTER — ALLIED HEALTH (OUTPATIENT)
Dept: INTEGRATIVE MEDICINE | Facility: CLINIC | Age: 69
End: 2024-02-08
Payer: MEDICARE

## 2024-02-08 DIAGNOSIS — M99.04 SEGMENTAL AND SOMATIC DYSFUNCTION OF SACRAL REGION: ICD-10-CM

## 2024-02-08 DIAGNOSIS — M54.2 CERVICALGIA: ICD-10-CM

## 2024-02-08 DIAGNOSIS — M79.10 MYALGIA: ICD-10-CM

## 2024-02-08 DIAGNOSIS — M99.01 SEGMENTAL AND SOMATIC DYSFUNCTION OF CERVICAL REGION: Primary | ICD-10-CM

## 2024-02-08 DIAGNOSIS — M53.9 MULTILEVEL DEGENERATIVE DISC DISEASE: ICD-10-CM

## 2024-02-08 DIAGNOSIS — M99.02 SEGMENTAL AND SOMATIC DYSFUNCTION OF THORACIC REGION: ICD-10-CM

## 2024-02-08 PROCEDURE — 98941 CHIROPRACT MANJ 3-4 REGIONS: CPT | Performed by: CHIROPRACTOR

## 2024-02-08 NOTE — PROGRESS NOTES
Subjective   Patient ID: Rose Sutton is a 69 y.o. female who presents February 8, 2024 for neck and low back pain.    No limit-Medicare    Today, the patient rates their degree of pain as a 4 out of 10 on the numeric pain rating scale.     ROSE GUADARRAMA returns for continued treatment of their neck/upper back pain and left hip discomfort. She states that she had a mild flare up lf right sided neck pain that started when she was reaching for something under the couch 2 days ago. She states that she has taken advil and that has helped relieve symptoms. She states that she has been having left knee pain while walking. Denies any associated symptoms or change in medical history since last visit and will follow up PRN.          Objective   Physical Exam  Constitutional:       General: She is not in acute distress.     Appearance: Normal appearance.   HENT:      Head: Normocephalic and atraumatic.   Musculoskeletal:      Cervical back: Decreased range of motion.      Lumbar back: Tenderness present. Decreased range of motion.        Back:    Neurological:      General: No focal deficit present.      Mental Status: She is alert and oriented to person, place, and time.      Cranial Nerves: No dysarthria or facial asymmetry.      Sensory: Sensation is intact.      Motor: Motor function is intact.      Coordination: Coordination is intact.      Gait: Gait is intact.   Psychiatric:         Attention and Perception: Attention normal.         Mood and Affect: Mood normal.         Speech: Speech normal.         Behavior: Behavior is cooperative.       Palpation of the following region(s) revealed:  Cervical: Levator scapulae right, hypertonicity and tenderness.  Cervical paraspinals bilateral, muscular hypertonicity.  Thoracic: Thoracic paraspinals right, hypertonicity and tenderness.  Lumbar: Lumbar paraspinals bilateral, hypertonicity and tenderness.  Gluteal right, hypertonicity and tenderness.        Segmental Joint(s):  Segmental joint dysfunction was assessed with motion palpation and is identified in the following areas:  Cervical : C1 C4 C6 C7  Thoracic : T1, T4, and T8  Lumbopelvic / Sacral SIJ : L5/S1, R SIJ, and L SIJ  Upper / Lower Extremities : L Knee      Assessment/Plan   Today's Treatment Included: Chiropractic manipulation to the Segmental Joint(s) Cervical : C1 C4 C6 C7 Segmental Joint(s) Lumbopelvic/Sacral SIJ : L5/S1, R SIJ, and L SIJ Segmental Joint(s) Thoracic : T1, T4, and T8 Segmental Joints Upper/Lower Extremities : L Knee  Treatment Techniques Used : Pelvic drop table technique, Manual traction, and Low force  Soft-Tissue manipulation    Soft-tissue mobilization was performed in the following areas:   Cervical paraspinal mm. bilateral and Levator Scap. right  Thoracic Paraspinal mm. right  Lumbar Paraspinal mm. bilateral and Gluteal mm. Glute. Max.  and Glute. Med. right            The patient tolerated today's treatment with little or no additional discomfort and was instructed to contact the office for questions or concerns.

## 2024-02-14 ENCOUNTER — ALLIED HEALTH (OUTPATIENT)
Dept: INTEGRATIVE MEDICINE | Facility: CLINIC | Age: 69
End: 2024-02-14
Payer: MEDICARE

## 2024-02-14 DIAGNOSIS — M54.59 OTHER LOW BACK PAIN: Primary | ICD-10-CM

## 2024-02-14 PROCEDURE — 97811 ACUP 1/> W/O ESTIM EA ADD 15: CPT | Performed by: INTERNAL MEDICINE

## 2024-02-14 PROCEDURE — 97810 ACUP 1/> WO ESTIM 1ST 15 MIN: CPT | Performed by: INTERNAL MEDICINE

## 2024-02-14 NOTE — PROGRESS NOTES
Acupuncture Visit:     Subjective   Patient ID: Saumya Sutton is a 69 y.o. female who presents for No chief complaint on file.    Patient ID: Saumya Sutton is a 68 y.o. female who presents for other low back pain    LBP - much improved for 5 days post tx. Notes LBP, mid back pain. Pain gradually came back, not as high as it was prior to tx.    Session Information  Is this acupuncture treatment being billed to the patient's insurance company: Yes  The patient has a total number of visits of: 12  Initial Acupuncture Treatment date: 10/26/23  Last Treatment date: 01/26/24  Name of Insurance Company: Medicare A + B    Initial:  LBP is generally 2-3/10 on a daily basis is she is resting. Notes that certain activities will aggravate her back pain, and sleeping in certain positions. Getting out of a sitting position is very painful. Pain goes up to an 8/10 3/week with certain activities, movements takes advil, ice and heat when the pain is bothering her. Notes daily hip pain as well, L hip worse than R hip.     Provider reviewed plan for the acupuncture session, precautions and contraindications. Patient/guardian/hospital staff has given consent to treat with full understanding of what to expect during the session. Before acupuncture began, provider explained to the patient to communicate at any time if the procedure was causing discomfort past their tolerance level. Patient agreed to advise acupuncturist. The acupuncturist counseled the patient on the risks of acupuncture treatment including pain, infection, bleeding, and no relief of pain. The patient was positioned comfortably. There was no evidence of infection at the site of needle insertions.                   Session Information  Is this acupuncture treatment being billed to the patient's insurance company: Yes  This is visit number: 3  The patient has a total number of visits of: 8  Initial Acupuncture Treatment date: 10/26/23  Last Treatment date:  01/26/24  Name of Insurance Company: Medicare A + B 90 days ending 1/26/24  Name of Supervising Physician: Nehal Zarate  Visit Type: Follow-up visit  Description of present complaint: Chronic pain         Review of Systems         Provider reviewed plan for the acupuncture session, precautions and contraindications. Patient/guardian/hospital staff has given consent to treat with full understanding of what to expect during the session. Before acupuncture began, provider explained to the patient to communicate at any time if the procedure was causing discomfort past their tolerance level. Patient agreed to advise acupuncturist. The acupuncturist counseled the patient on the risks of acupuncture treatment including pain, infection, bleeding, and no relief of pain. The patient was positioned comfortably. There was no evidence of infection at the site of needle insertions.    Objective   Physical Exam              Acupuncture Treatment  Needle Guage: 36 guage /.20/ Blue seirin  Body Points: With retention  Body Points - Left: KI6 - SP9 1.5 SP6 4 - 88.12 88.13 88.14  Body Points - Bilateral: SJ5 - PC6/SP4 LI4/LR3  Body Points - Right: 88.09 88.10 88.11  Other Techniques Utilized: TDP Lamp  TDP Lamp Descripton: [employer] - MT points 88.09 set  Needle Count In: 21  Needle Count Out: 21  Needle Retention Time (min): 30 minutes  Total Face to Face Time (min): 30 minutes              Assessment/Plan

## 2024-02-21 ENCOUNTER — ALLIED HEALTH (OUTPATIENT)
Dept: INTEGRATIVE MEDICINE | Facility: CLINIC | Age: 69
End: 2024-02-21
Payer: MEDICARE

## 2024-02-21 ENCOUNTER — ALLIED HEALTH (OUTPATIENT)
Dept: INTEGRATIVE MEDICINE | Facility: CLINIC | Age: 69
End: 2024-02-21

## 2024-02-21 DIAGNOSIS — M54.2 CERVICALGIA: ICD-10-CM

## 2024-02-21 DIAGNOSIS — M54.59 OTHER LOW BACK PAIN: Primary | ICD-10-CM

## 2024-02-21 DIAGNOSIS — M25.551 CHRONIC HIP PAIN, BILATERAL: ICD-10-CM

## 2024-02-21 DIAGNOSIS — M25.552 CHRONIC HIP PAIN, BILATERAL: ICD-10-CM

## 2024-02-21 DIAGNOSIS — M99.02 SEGMENTAL AND SOMATIC DYSFUNCTION OF THORACIC REGION: ICD-10-CM

## 2024-02-21 DIAGNOSIS — M53.9 MULTILEVEL DEGENERATIVE DISC DISEASE: ICD-10-CM

## 2024-02-21 DIAGNOSIS — M99.04 SEGMENTAL AND SOMATIC DYSFUNCTION OF SACRAL REGION: ICD-10-CM

## 2024-02-21 DIAGNOSIS — G89.29 CHRONIC HIP PAIN, BILATERAL: ICD-10-CM

## 2024-02-21 DIAGNOSIS — M99.01 SEGMENTAL AND SOMATIC DYSFUNCTION OF CERVICAL REGION: Primary | ICD-10-CM

## 2024-02-21 DIAGNOSIS — M79.10 MYALGIA: ICD-10-CM

## 2024-02-21 PROCEDURE — MASS60G MASSAGE 60 MINUTES: Performed by: MASSAGE THERAPIST

## 2024-02-21 PROCEDURE — 97810 ACUP 1/> WO ESTIM 1ST 15 MIN: CPT | Performed by: INTERNAL MEDICINE

## 2024-02-21 PROCEDURE — 97811 ACUP 1/> W/O ESTIM EA ADD 15: CPT | Performed by: INTERNAL MEDICINE

## 2024-02-21 PROCEDURE — 98941 CHIROPRACT MANJ 3-4 REGIONS: CPT | Performed by: CHIROPRACTOR

## 2024-02-21 NOTE — PROGRESS NOTES
Subjective   Patient ID: Rose Sutton is a 69 y.o. female who presents February 21, 2024 for neck and low back pain.    No limit-Medicare    Today, the patient rates their degree of pain as a 4 out of 10 on the numeric pain rating scale.     ROSE GUADARRAMA returns for continued treatment of their neck/upper back pain and left hip discomfort. She states that she had improvement in neck symptoms after last visit that has lasted until today. She states that she has right anterior hip pain and left knee pain. She states that she feels like she is limping more than she used to.  Denies any associated symptoms or change in medical history since last visit and will follow up PRN.            Objective   Physical Exam  Constitutional:       General: She is not in acute distress.     Appearance: Normal appearance.       HENT:      Head: Normocephalic and atraumatic.   Musculoskeletal:      Cervical back: Decreased range of motion.      Lumbar back: Tenderness present. Decreased range of motion.   Neurological:      General: No focal deficit present.      Mental Status: She is alert and oriented to person, place, and time.      Cranial Nerves: No dysarthria or facial asymmetry.      Sensory: Sensation is intact.      Motor: Motor function is intact.      Coordination: Coordination is intact.      Gait: Gait is intact.   Psychiatric:         Attention and Perception: Attention normal.         Mood and Affect: Mood normal.         Speech: Speech normal.         Behavior: Behavior is cooperative.       Palpation of the following region(s) revealed:  Cervical: Levator scapulae right, hypertonicity and tenderness.  Cervical paraspinals bilateral, muscular hypertonicity.  Thoracic: Thoracic paraspinals right, hypertonicity and tenderness.  Lumbar: Lumbar paraspinals bilateral, hypertonicity and tenderness.  Gluteal right, hypertonicity and tenderness.        Segmental Joint(s): Segmental joint dysfunction was assessed with motion  palpation and is identified in the following areas:  Cervical : C1 C4 C6 C7  Thoracic : T1, T4, and T8  Lumbopelvic / Sacral SIJ : L5/S1, R SIJ, and L SIJ  Upper / Lower Extremities : R Hip, L Hip, L Knee, and L Fibular head       Assessment/Plan   Today's Treatment Included: Chiropractic manipulation to the Segmental Joint(s) Cervical : C1 C4 C6 C7 Segmental Joint(s) Lumbopelvic/Sacral SIJ : L5/S1, R SIJ, and L SIJ Segmental Joint(s) Thoracic : T1, T4, and T8 Segmental Joints Upper/Lower Extremities : R Hip, L Hip, L Knee, and L Fibular head  Treatment Techniques Used : Pelvic drop table technique, Manual traction, and Low force  Soft-Tissue manipulation    Soft-tissue mobilization was performed in the following areas:   Cervical paraspinal mm. bilateral and Levator Scap. right  Thoracic Paraspinal mm. right  Lumbar Paraspinal mm. bilateral and Gluteal mm. Glute. Max.  and Glute. Med. right            The patient tolerated today's treatment with little or no additional discomfort and was instructed to contact the office for questions or concerns.

## 2024-02-21 NOTE — PROGRESS NOTES
Acupuncture Visit:     Subjective   Patient ID: Saumya Sutton is a 69 y.o. female who presents for No chief complaint on file.    Patient ID: Saumya Sutton is a 68 y.o. female who presents for other low back pain    LBP - much improved for 5 days post tx. Notes LBP, mid back pain. Pain gradually came back, not as high as it was prior to tx. Overall having less days with pain.    Session Information  Is this acupuncture treatment being billed to the patient's insurance company: Yes  The patient has a total number of visits of: 12  Initial Acupuncture Treatment date: 10/26/23  Last Treatment date: 01/26/24  Name of Insurance Company: Medicare A + B    Initial:  LBP is generally 2-3/10 on a daily basis is she is resting. Notes that certain activities will aggravate her back pain, and sleeping in certain positions. Getting out of a sitting position is very painful. Pain goes up to an 8/10 3/week with certain activities, movements takes advil, ice and heat when the pain is bothering her. Notes daily hip pain as well, L hip worse than R hip.     Provider reviewed plan for the acupuncture session, precautions and contraindications. Patient/guardian/hospital staff has given consent to treat with full understanding of what to expect during the session. Before acupuncture began, provider explained to the patient to communicate at any time if the procedure was causing discomfort past their tolerance level. Patient agreed to advise acupuncturist. The acupuncturist counseled the patient on the risks of acupuncture treatment including pain, infection, bleeding, and no relief of pain. The patient was positioned comfortably. There was no evidence of infection at the site of needle insertions.                   Session Information  Is this acupuncture treatment being billed to the patient's insurance company: Yes  This is visit number: 4  The patient has a total number of visits of: 8  Initial Acupuncture Treatment date:  10/26/23  Last Treatment date: 01/26/24  Name of Insurance Company: Medicare A + B 90 days ending 1/26/24  Name of Supervising Physician: Nehal Zarate  Visit Type: Follow-up visit  Description of present complaint: Chronic pain         Review of Systems         Provider reviewed plan for the acupuncture session, precautions and contraindications. Patient/guardian/hospital staff has given consent to treat with full understanding of what to expect during the session. Before acupuncture began, provider explained to the patient to communicate at any time if the procedure was causing discomfort past their tolerance level. Patient agreed to advise acupuncturist. The acupuncturist counseled the patient on the risks of acupuncture treatment including pain, infection, bleeding, and no relief of pain. The patient was positioned comfortably. There was no evidence of infection at the site of needle insertions.    Objective   Physical Exam              Acupuncture Treatment  Patient Position: Supine on a table  Acupuncture Needling: Yes  Needle Guage: 36 guage /.20/ Blue seirin  Body Points: With retention  Body Points - Left: SP9 1.5 SP6 4 - 88.12 88.13 88.14 ( last 20 min)  Body Points - Bilateral: KI6 - PC6/SP4 LI4/LR3  Body Points - Right: 88.09 88.10 88.11 - 88.01 88.02 88.03 (first 15 min)  Other Techniques Utilized: TDP Lamp  TDP Lamp Descripton: [employer] - MT points 88.09 set  Needle Count In: 23  Needle Count Out: 23  Needle Retention Time (min): 30 minutes  Total Face to Face Time (min): 30 minutes              Assessment/Plan

## 2024-02-22 NOTE — PROGRESS NOTES
Massage Therapy Visit:     Saumya Sutton was self-referred.    Condition of Client Subjective :  Patient ID: Saumya Sutton is a 69 y.o. female who presents for reason for visit of Muscle tension relief and Back Pain.  HPI    Session Information  Visit Type: New patient  Description of present complaint: Discomfort, Stress, Muscle tension, Chronic pain, Range of motion (ROM)    Review of Systems    Objective        Physical Exam    Actions Assessment/Plan :    Massage Treatment  Patient Position: Table, Supine  Positioning Assistance: Pillow(s)/bolster under knees while supine, Did not need assistance  Massage Technique: Cranio-sacral therapy, Myofascial release, Therapeutic massage, Stretching, Soft tissue mobilization  Pressure Scale: 2 - Mild pressure, 3 - Medium pressure, 4 - Moderate pressure  Action Note: 50 minute massage, supine/.  /Medium/Firm pressure.  Therapeutic pressure. MFR techniques.  Relaxation protocol.    Supine: Cervical/upper body, kneading, stripping, palming, effleurage, petrissage, cross fiber, on SCM, SITS, Levator Scapulae, Trapezius, Rhomboids, upper Pectoralis, Deltoids.  UE, stretching, cross fiber, palming, kneading, effleurage, on Deltoids, Biceps, Triceps, forearm muscles, hand muscles.  LE, kneading, stripping, palming, knuckle, effleurage, petrissage, cross fiber, stretching, on Vastus group, TFL, lateral approach to Hip Flexors, Hamstrings, Sartorius, Gracilis, Gastrocnemius, Soleus, Tibialis, peroneus.    Response:  Post-treatment Assessment  Patient Fell Asleep During Treatment: No  Patient Noted Improvement of the Following Symptoms: Muscle tension, ROM    Evaluation:   Massage Therapy Evaluation / Recommendation(s) / Follow-up  Post-Treatment Recommendation: Increase hydration, stretching, maintain movement/exercise protocol.  Follow-up: Follow up as needed, 2-4 weeks.

## 2024-02-24 PROBLEM — G89.29 CHRONIC HIP PAIN, BILATERAL: Status: ACTIVE | Noted: 2024-02-24

## 2024-02-24 PROBLEM — M25.551 CHRONIC HIP PAIN, BILATERAL: Status: ACTIVE | Noted: 2024-02-24

## 2024-02-24 PROBLEM — M25.552 CHRONIC HIP PAIN, BILATERAL: Status: ACTIVE | Noted: 2024-02-24

## 2024-02-28 ENCOUNTER — ALLIED HEALTH (OUTPATIENT)
Dept: INTEGRATIVE MEDICINE | Facility: CLINIC | Age: 69
End: 2024-02-28
Payer: MEDICARE

## 2024-02-28 ENCOUNTER — ALLIED HEALTH (OUTPATIENT)
Dept: INTEGRATIVE MEDICINE | Facility: CLINIC | Age: 69
End: 2024-02-28

## 2024-02-28 DIAGNOSIS — M54.59 OTHER LOW BACK PAIN: Primary | ICD-10-CM

## 2024-02-28 PROCEDURE — 97811 ACUP 1/> W/O ESTIM EA ADD 15: CPT | Performed by: ACUPUNCTURIST

## 2024-02-28 PROCEDURE — 97810 ACUP 1/> WO ESTIM 1ST 15 MIN: CPT | Performed by: ACUPUNCTURIST

## 2024-02-28 PROCEDURE — MASS(60M) MASSAGE 60 MINUTES: Performed by: MASSAGE THERAPIST

## 2024-02-28 NOTE — PROGRESS NOTES
Acupuncture Visit:     Subjective   Patient ID: Saumya Sutton is a 69 y.o. female who presents for No chief complaint on file.    Patient ID: Saumya Sutton is a 68 y.o. female who presents for other low back pain    LBP - much improved overall, less pain . Notes LBP, mid back pain. Overall having less days with pain.    Session Information  Is this acupuncture treatment being billed to the patient's insurance company: Yes  The patient has a total number of visits of: 12  Initial Acupuncture Treatment date: 10/26/23  Last Treatment date: 01/26/24  Name of Insurance Company: Medicare A + B    Initial:  LBP is generally 2-3/10 on a daily basis is she is resting. Notes that certain activities will aggravate her back pain, and sleeping in certain positions. Getting out of a sitting position is very painful. Pain goes up to an 8/10 3/week with certain activities, movements takes advil, ice and heat when the pain is bothering her. Notes daily hip pain as well, L hip worse than R hip.     Provider reviewed plan for the acupuncture session, precautions and contraindications. Patient/guardian/hospital staff has given consent to treat with full understanding of what to expect during the session. Before acupuncture began, provider explained to the patient to communicate at any time if the procedure was causing discomfort past their tolerance level. Patient agreed to advise acupuncturist. The acupuncturist counseled the patient on the risks of acupuncture treatment including pain, infection, bleeding, and no relief of pain. The patient was positioned comfortably. There was no evidence of infection at the site of needle insertions.                   Session Information  Is this acupuncture treatment being billed to the patient's insurance company: Yes  This is visit number: 5  The patient has a total number of visits of: 8  Initial Acupuncture Treatment date: 10/26/23  Last Treatment date: 01/26/24  Name of Insurance Company:  Medicare A + B 90 days ending 1/26/24  Name of Supervising Physician: Nehal Zarate  Visit Type: Follow-up visit  Description of present complaint: Chronic pain         Review of Systems         Provider reviewed plan for the acupuncture session, precautions and contraindications. Patient/guardian/hospital staff has given consent to treat with full understanding of what to expect during the session. Before acupuncture began, provider explained to the patient to communicate at any time if the procedure was causing discomfort past their tolerance level. Patient agreed to advise acupuncturist. The acupuncturist counseled the patient on the risks of acupuncture treatment including pain, infection, bleeding, and no relief of pain. The patient was positioned comfortably. There was no evidence of infection at the site of needle insertions.    Objective   Physical Exam              Acupuncture Treatment  Patient Position: Supine on a table  Acupuncture Needling: Yes  Needle Guage: 32 guage /.25/ Purple seirin  Body Points: With retention  Body Points - Left: SP9 1.5 SP6 4 - 88.12 88.13 88.14 ( last 20 min)  Body Points - Bilateral: KI6 - LI11 GB21 LI4/LR3 - YT DU24  Body Points - Right: 88.09 88.10 88.11  Other Techniques Utilized: TDP Lamp  TDP Lamp Descripton: [employer] - MT points 88.09 set  Needle Count In: 22  Needle Count Out: 22  Needle Retention Time (min): 30 minutes  Total Face to Face Time (min): 30 minutes              Assessment/Plan

## 2024-03-01 NOTE — PROGRESS NOTES
Massage Therapy Visit:     Saumya Sutton was self-referred.    Condition of Client Subjective :  Patient ID: Saumya Sutton is a 69 y.o. female who presents for reason for visit of Muscle tension relief.  HPI    Session Information  Visit Type: Follow-up visit  Description of present complaint: Discomfort, Range of motion (ROM), Chronic pain, Muscle tension, Stress    Review of Systems    Objective   Pre-treatment Assessment  Condition of Client Note: Focus on back muscles, hip flexors.  *REQUESTS CHAIR MASSAGE TO ACCESS POSTERIOR THORACIC, LUMBAR MUSCLES.    Physical Exam    Actions Assessment/Plan :    Massage Treatment  Patient Position: Table, Side-lying  Positioning Assistance: Pillow(s)/bolster for positioning during side-lying, Did not need assistance  Massage Technique: Therapeutic massage, Soft tissue mobilization, Relaxation massage, Myofascial release, Mobilization, Stretching  Area/Body Region: upper body, hip flexors  Pressure Scale: 2 - Mild pressure, 3 - Medium pressure, 4 - Moderate pressure  Action Note: Light/Medium/Firm pressure.  Therapeutic pressure. MFR techniques.  Relaxation protocol...   kneading, stripping, palming, knuckle, effleurage, petrissage, cross fiber, stretching, on SI joint, piriformis, gluteus group, erectors, Longissimus, QL, Obliques, paraspinals, SITS, Cervicals, Trapezius.    Response:  Post-treatment Assessment  Patient Fell Asleep During Treatment: No  Patient Noted Improvement of the Following Symptoms: Muscle tension, ROM    Evaluation:   Massage Therapy Evaluation / Recommendation(s) / Follow-up  Post-Treatment Recommendation: Increase hydration  Follow-up: Follow up as needed.

## 2024-03-05 ENCOUNTER — APPOINTMENT (OUTPATIENT)
Dept: INTEGRATIVE MEDICINE | Facility: CLINIC | Age: 69
End: 2024-03-05
Payer: MEDICARE

## 2024-03-06 ENCOUNTER — ALLIED HEALTH (OUTPATIENT)
Dept: INTEGRATIVE MEDICINE | Facility: CLINIC | Age: 69
End: 2024-03-06
Payer: MEDICARE

## 2024-03-06 DIAGNOSIS — M54.59 OTHER LOW BACK PAIN: Primary | ICD-10-CM

## 2024-03-06 PROCEDURE — 97811 ACUP 1/> W/O ESTIM EA ADD 15: CPT | Performed by: INTERNAL MEDICINE

## 2024-03-06 PROCEDURE — 97810 ACUP 1/> WO ESTIM 1ST 15 MIN: CPT | Performed by: INTERNAL MEDICINE

## 2024-03-06 PROCEDURE — MASS(60M) MASSAGE 60 MINUTES: Performed by: MASSAGE THERAPIST

## 2024-03-06 NOTE — PROGRESS NOTES
Acupuncture Visit:     Subjective   Patient ID: Saumya Sutton is a 69 y.o. female who presents for No chief complaint on file.    Patient ID: Saumya Sutton is a 68 y.o. female who presents for other low back pain    LBP - much improved overall, less pain . Notes LBP, mid back pain. Overall having less days with pain.    Session Information  Is this acupuncture treatment being billed to the patient's insurance company: Yes  The patient has a total number of visits of: 12  Initial Acupuncture Treatment date: 10/26/23  Last Treatment date: 01/26/24  Name of Insurance Company: Medicare A + B    Initial:  LBP is generally 2-3/10 on a daily basis is she is resting. Notes that certain activities will aggravate her back pain, and sleeping in certain positions. Getting out of a sitting position is very painful. Pain goes up to an 8/10 3/week with certain activities, movements takes advil, ice and heat when the pain is bothering her. Notes daily hip pain as well, L hip worse than R hip.     Provider reviewed plan for the acupuncture session, precautions and contraindications. Patient/guardian/hospital staff has given consent to treat with full understanding of what to expect during the session. Before acupuncture began, provider explained to the patient to communicate at any time if the procedure was causing discomfort past their tolerance level. Patient agreed to advise acupuncturist. The acupuncturist counseled the patient on the risks of acupuncture treatment including pain, infection, bleeding, and no relief of pain. The patient was positioned comfortably. There was no evidence of infection at the site of needle insertions.                   Session Information  Is this acupuncture treatment being billed to the patient's insurance company: Yes  This is visit number: 6  The patient has a total number of visits of: 8  Initial Acupuncture Treatment date: 10/26/23  Last Treatment date: 01/26/24  Name of Insurance Company:  Medicare A + B 90 days ending 1/26/24  Name of Supervising Physician: Nehal Zarate  Visit Type: Follow-up visit  Description of present complaint: Chronic pain         Review of Systems         Provider reviewed plan for the acupuncture session, precautions and contraindications. Patient/guardian/hospital staff has given consent to treat with full understanding of what to expect during the session. Before acupuncture began, provider explained to the patient to communicate at any time if the procedure was causing discomfort past their tolerance level. Patient agreed to advise acupuncturist. The acupuncturist counseled the patient on the risks of acupuncture treatment including pain, infection, bleeding, and no relief of pain. The patient was positioned comfortably. There was no evidence of infection at the site of needle insertions.    Objective   Physical Exam              Acupuncture Treatment  Patient Position: Supine on a table  Acupuncture Needling: Yes  Needle Guage: 36 guage /.20/ Blue seirin  Body Points: With retention  Body Points - Left: SP9 1.5 SP6 4 - 88.12 88.13 88.14  Body Points - Bilateral: CV22 KI27 LU7 KI6  Body Points - Right: 88.09 88.10 88.11  Other Techniques Utilized: TDP Lamp  TDP Lamp Descripton: [employer] - MT points 88.09 set  Needle Count In: 17  Needle Count Out: 17  Needle Retention Time (min): 30 minutes  Total Face to Face Time (min): 30 minutes              Assessment/Plan

## 2024-03-06 NOTE — PROGRESS NOTES
Massage Therapy Visit:     Saumya Sutton was self-referred.    Condition of Client Subjective :  Patient ID: Saumya Sutton is a 69 y.o. female who presents for reason for visit of Back Pain and Muscle tension relief.  HPI    Session Information  Visit Type: Follow-up visit  Description of present complaint: Discomfort, Fatigue, Range of motion (ROM), Chronic pain, Muscle tension, Stress    Review of Systems    Objective   Pre-treatment Assessment  Condition of Client Note: Chair massage requested, focus on back. Followed by table massage for legs.    Physical Exam    Actions Assessment/Plan :    Massage Treatment  Patient Position: Table, Supine, Seated  Positioning Assistance: Pillow(s)/bolster under knees while supine, Did not need assistance  Massage Technique: Therapeutic massage, Stretching, Soft tissue mobilization, Relaxation massage, Myofascial release  Pressure Scale: 2 - Mild pressure, 3 - Medium pressure, 4 - Moderate pressure    Response:  Post-treatment Assessment  Patient Fell Asleep During Treatment: No  Patient Noted Improvement of the Following Symptoms: Muscle tension, ROM    Evaluation:   Massage Therapy Evaluation / Recommendation(s) / Follow-up  Post-Treatment Recommendation: Increase hydration, stretching.  Follow-up: Follow up scheduled.

## 2024-03-12 ENCOUNTER — ALLIED HEALTH (OUTPATIENT)
Dept: INTEGRATIVE MEDICINE | Facility: CLINIC | Age: 69
End: 2024-03-12
Payer: MEDICARE

## 2024-03-12 DIAGNOSIS — M79.10 MYALGIA: Primary | ICD-10-CM

## 2024-03-12 DIAGNOSIS — M54.59 OTHER LOW BACK PAIN: Primary | ICD-10-CM

## 2024-03-12 PROCEDURE — 97810 ACUP 1/> WO ESTIM 1ST 15 MIN: CPT | Performed by: INTERNAL MEDICINE

## 2024-03-12 PROCEDURE — 97811 ACUP 1/> W/O ESTIM EA ADD 15: CPT | Performed by: INTERNAL MEDICINE

## 2024-03-12 PROCEDURE — MASSG60 MASSAGE 60 MINUTES: Performed by: MASSAGE THERAPIST

## 2024-03-12 ASSESSMENT — PAIN SCALES - GENERAL: PAINLEVEL: 1

## 2024-03-12 NOTE — PROGRESS NOTES
"Chantell is here \"feeling pretty good today\".  " [FreeTextEntry1] : 43yo female with PCOS, irregular menses and simple hyperplasia without atypia presented to my office on 4/13/23 for a consultation. I discussed with patient treatment options of Provera therapy x 3 months vs IUD vs Hysterectomy. I did not feel like hysterectomy was warranted at that time as simple hyperplasias can be managed with medical therapy alone for the most part. I advised the patient that I favored Mirena IUD given her weight issue and increased risk of hyperplasia, however, patient did not like the idea of a foreign body in her uterus. I explained to the patient that with IUD biopsies would be less frequent as well. Patient wished to proceed with Provera therapy and plan was for patient to have an EMB and US in 3 months. Patient was advised to contact me if she changed her  mind and wanted to pursue IUD. \par \par Patient has a telehealth visit today to discuss plan of care. \par

## 2024-03-12 NOTE — PROGRESS NOTES
Acupuncture Visit:     Subjective   Patient ID: Saumya Sutton is a 69 y.o. female who presents for No chief complaint on file.    Patient ID: Saumya Sutton is a 68 y.o. female who presents for other low back pain    LBP - much improved overall, less pain   Having combination of acu, massage and chiro is helping   Having much less days with pain, some days with no pain at all  Notes LBP, mid back pain  Overall having less days with pain    Session Information  Is this acupuncture treatment being billed to the patient's insurance company: Yes  The patient has a total number of visits of: 12  Initial Acupuncture Treatment date: 10/26/23  Last Treatment date: 01/26/24  Name of Insurance Company: Medicare A + B    Initial:  LBP is generally 2-3/10 on a daily basis is she is resting. Notes that certain activities will aggravate her back pain, and sleeping in certain positions. Getting out of a sitting position is very painful. Pain goes up to an 8/10 3/week with certain activities, movements takes advil, ice and heat when the pain is bothering her. Notes daily hip pain as well, L hip worse than R hip.     Provider reviewed plan for the acupuncture session, precautions and contraindications. Patient/guardian/hospital staff has given consent to treat with full understanding of what to expect during the session. Before acupuncture began, provider explained to the patient to communicate at any time if the procedure was causing discomfort past their tolerance level. Patient agreed to advise acupuncturist. The acupuncturist counseled the patient on the risks of acupuncture treatment including pain, infection, bleeding, and no relief of pain. The patient was positioned comfortably. There was no evidence of infection at the site of needle insertions.                 Session Information  Is this acupuncture treatment being billed to the patient's insurance company: Yes  This is visit number: 7  The patient has a total number  of visits of: 8  Initial Acupuncture Treatment date: 10/26/23  Last Treatment date: 01/26/24  Name of Insurance Company: Medicare A + B 90 days ending 1/26/24  Name of Supervising Physician: Nehal Zarate  Visit Type: Follow-up visit         Review of Systems         Provider reviewed plan for the acupuncture session, precautions and contraindications. Patient/guardian/hospital staff has given consent to treat with full understanding of what to expect during the session. Before acupuncture began, provider explained to the patient to communicate at any time if the procedure was causing discomfort past their tolerance level. Patient agreed to advise acupuncturist. The acupuncturist counseled the patient on the risks of acupuncture treatment including pain, infection, bleeding, and no relief of pain. The patient was positioned comfortably. There was no evidence of infection at the site of needle insertions.    Objective   Physical Exam                             Assessment/Plan

## 2024-03-14 NOTE — PROGRESS NOTES
Massage Therapy Visit:     Saumya Sutton was self-referred.    Condition of Client Subjective :  Patient ID: Saumya Sutton is a 69 y.o. female who presents for reason for visit of Muscle tension relief.  HPI    Session Information  Visit Type: Follow-up visit  Description of present complaint: Discomfort, Fatigue, Range of motion (ROM), Chronic pain, Muscle tension, Stress    Review of Systems    Objective   Pre-treatment Assessment  Condition of Client Note: Requests combination of chaitr massage and table massage.  Tension on back muscles, anterior leg muscles.    Physical Exam    Actions Assessment/Plan :    Massage Treatment  Patient Position: Table, Supine, Chair  Positioning Assistance: Pillow(s)/bolster under knees while supine, Did not need assistance  Massage Technique: Therapeutic massage, Soft tissue mobilization, Stretching, Myofascial release, Relaxation massage  Pressure Scale: 2 - Mild pressure, 3 - Medium pressure, 4 - Moderate pressure  Action Note: Chair massage, kneading, stripping, palming, knuckle, effleurage, petrissage, cross fiber, stretching, on erectors, Longissimus, QL, Obliques, paraspinals, SITS, Cervicals, Trapezius.                                               Supine: Cervical/upper body, kneading, stripping, palming, effleurage, petrissage, cross fiber, on Platysma, SCM, SITS, Levator Scapulae, Trapezius, Rhomboids, upper Pectoralis, Deltoids.  UE, stretching, cross fiber, palming, kneading, effleurage, on Deltoids, Biceps, Triceps, forearm muscles, hand muscles.  LE, kneading, stripping, palming, knuckle, effleurage, petrissage, cross fiber, stretching, on  Gastrocnemius, Soleus, Tibialis..    Response:  Post-treatment Assessment  Patient Fell Asleep During Treatment: No  Patient Noted Improvement of the Following Symptoms: Muscle tension, ROM    Evaluation:   Massage Therapy Evaluation / Recommendation(s) / Follow-up  Follow-up: Follow up scheduled.

## 2024-03-20 ENCOUNTER — ALLIED HEALTH (OUTPATIENT)
Dept: INTEGRATIVE MEDICINE | Facility: CLINIC | Age: 69
End: 2024-03-20
Payer: MEDICARE

## 2024-03-20 ENCOUNTER — ALLIED HEALTH (OUTPATIENT)
Dept: INTEGRATIVE MEDICINE | Facility: CLINIC | Age: 69
End: 2024-03-20

## 2024-03-20 DIAGNOSIS — M54.59 OTHER LOW BACK PAIN: Primary | ICD-10-CM

## 2024-03-20 PROCEDURE — 97810 ACUP 1/> WO ESTIM 1ST 15 MIN: CPT | Performed by: INTERNAL MEDICINE

## 2024-03-20 PROCEDURE — MASSG60 MASSAGE 60 MINUTES: Performed by: MASSAGE THERAPIST

## 2024-03-20 PROCEDURE — CYTAX SALES TAX CUYAHOGA COUNT: Performed by: MASSAGE THERAPIST

## 2024-03-20 PROCEDURE — 97811 ACUP 1/> W/O ESTIM EA ADD 15: CPT | Performed by: INTERNAL MEDICINE

## 2024-03-20 NOTE — PROGRESS NOTES
Acupuncture Visit:     Subjective   Patient ID: Saumya Sutton is a 69 y.o. female who presents for No chief complaint on file.    Patient ID: Saumya Sutton is a 68 y.o. female who presents for other low back pain    LBP - much improved overall, less pain   Having combination of acu, massage and chiro is helping   Having much less days with pain, some days with no pain at all  Notes LBP, mid back pain  Overall having less days with pain    Session Information  Is this acupuncture treatment being billed to the patient's insurance company: Yes  The patient has a total number of visits of: 12  Initial Acupuncture Treatment date: 10/26/23  Last Treatment date: 01/26/24  Name of Insurance Company: Medicare A + B    Initial:  LBP is generally 2-3/10 on a daily basis is she is resting. Notes that certain activities will aggravate her back pain, and sleeping in certain positions. Getting out of a sitting position is very painful. Pain goes up to an 8/10 3/week with certain activities, movements takes advil, ice and heat when the pain is bothering her. Notes daily hip pain as well, L hip worse than R hip.     Provider reviewed plan for the acupuncture session, precautions and contraindications. Patient/guardian/hospital staff has given consent to treat with full understanding of what to expect during the session. Before acupuncture began, provider explained to the patient to communicate at any time if the procedure was causing discomfort past their tolerance level. Patient agreed to advise acupuncturist. The acupuncturist counseled the patient on the risks of acupuncture treatment including pain, infection, bleeding, and no relief of pain. The patient was positioned comfortably. There was no evidence of infection at the site of needle insertions.                   Session Information  Is this acupuncture treatment being billed to the patient's insurance company: Yes  This is visit number: 8  The patient has a total number  of visits of: 8  Initial Acupuncture Treatment date: 10/26/23  Last Treatment date: 01/26/24  Name of Insurance Company: Medicare A + B 90 days ending 1/26/24  Name of Supervising Physician: Nehal Zarate  Visit Type: Follow-up visit         Review of Systems         Provider reviewed plan for the acupuncture session, precautions and contraindications. Patient/guardian/hospital staff has given consent to treat with full understanding of what to expect during the session. Before acupuncture began, provider explained to the patient to communicate at any time if the procedure was causing discomfort past their tolerance level. Patient agreed to advise acupuncturist. The acupuncturist counseled the patient on the risks of acupuncture treatment including pain, infection, bleeding, and no relief of pain. The patient was positioned comfortably. There was no evidence of infection at the site of needle insertions.    Objective   Physical Exam              Acupuncture Treatment  Patient Position: Supine on a table  Acupuncture Needling: Yes  Needle Guage: 32 guage /.25/ Purple seirin  Body Points: With retention  Body Points - Left: SP9 1.5 SP6 4 - 88.12 88.13 88.14  Body Points - Bilateral: LI4/LR3 KI27 CV22 KI3 - YT  Body Points - Right: 88.09 88.10 88.11  Other Techniques Utilized: TDP Lamp  TDP Lamp Descripton: [employer] - MT points 88.09 set  Needle Count In: 20  Needle Count Out: 20  Needle Retention Time (min): 30 minutes  Total Face to Face Time (min): 30 minutes              Assessment/Plan

## 2024-03-27 ENCOUNTER — APPOINTMENT (OUTPATIENT)
Dept: INTEGRATIVE MEDICINE | Facility: CLINIC | Age: 69
End: 2024-03-27
Payer: MEDICARE

## 2024-04-10 ENCOUNTER — ALLIED HEALTH (OUTPATIENT)
Dept: INTEGRATIVE MEDICINE | Facility: CLINIC | Age: 69
End: 2024-04-10

## 2024-04-10 DIAGNOSIS — G89.29 CHRONIC HIP PAIN, BILATERAL: Primary | ICD-10-CM

## 2024-04-10 DIAGNOSIS — M25.552 CHRONIC HIP PAIN, BILATERAL: Primary | ICD-10-CM

## 2024-04-10 DIAGNOSIS — M25.551 CHRONIC HIP PAIN, BILATERAL: Primary | ICD-10-CM

## 2024-04-10 PROCEDURE — 97139 UNLISTED THERAPEUTIC PX: CPT | Performed by: ACUPUNCTURIST

## 2024-04-10 NOTE — PROGRESS NOTES
Acupuncture Visit:     Subjective   Patient ID: Saumya Sutton is a 69 y.o. female who presents for No chief complaint on file.  Pt seeking support for bilateral hip and groin pain related to overstretching        Session Information  Is this acupuncture treatment being billed to the patient's insurance company: No  Visit Type: Follow-up visit         Review of Systems         Provider reviewed plan for the acupuncture session, precautions and contraindications. Patient/guardian/hospital staff has given consent to treat with full understanding of what to expect during the session. Before acupuncture began, provider explained to the patient to communicate at any time if the procedure was causing discomfort past their tolerance level. Patient agreed to advise acupuncturist. The acupuncturist counseled the patient on the risks of acupuncture treatment including pain, infection, bleeding, and no relief of pain. The patient was positioned comfortably. There was no evidence of infection at the site of needle insertions.    Objective   Physical Exam         Treatment Plan  Treatment Goals: Pain management    Acupuncture Treatment  Body Points - Bilateral: sp 10, 9, 3.2, gb 34, piriformis release, k 7, 10, 27  Needle Count In: 16  Needle Count Out: 16  Needle Retention Time (min): 25 minutes  Total Face to Face Time (min): 25 minutes              Assessment/Plan

## 2024-04-17 ENCOUNTER — ALLIED HEALTH (OUTPATIENT)
Dept: INTEGRATIVE MEDICINE | Facility: CLINIC | Age: 69
End: 2024-04-17
Payer: MEDICARE

## 2024-04-17 DIAGNOSIS — M79.10 MYALGIA: ICD-10-CM

## 2024-04-17 DIAGNOSIS — M99.04 SEGMENTAL AND SOMATIC DYSFUNCTION OF SACRAL REGION: ICD-10-CM

## 2024-04-17 DIAGNOSIS — M99.01 SEGMENTAL AND SOMATIC DYSFUNCTION OF CERVICAL REGION: Primary | ICD-10-CM

## 2024-04-17 DIAGNOSIS — M54.2 CERVICALGIA: ICD-10-CM

## 2024-04-17 DIAGNOSIS — M53.9 MULTILEVEL DEGENERATIVE DISC DISEASE: ICD-10-CM

## 2024-04-17 DIAGNOSIS — M99.02 SEGMENTAL AND SOMATIC DYSFUNCTION OF THORACIC REGION: ICD-10-CM

## 2024-04-17 PROCEDURE — 98941 CHIROPRACT MANJ 3-4 REGIONS: CPT | Performed by: CHIROPRACTOR

## 2024-04-17 NOTE — PROGRESS NOTES
Subjective   Patient ID: Rose Sutton is a 69 y.o. female who presents April 17, 2024 for neck and low back pain.    No limit-Medicare    Today, the patient rates their degree of pain as a 6 out of 10 on the numeric pain rating scale.     ROSE GUADARRAMA returns for continued treatment of their neck/upper back pain and left hip discomfort. She states that she has a flare up of right sided neck/upper back pain. She states that it started this morning upon waking. She states that she has been dealing with right sided anterior hip pain for about 3 weeks. She states that it started when she was doing hip opener stretches. She states that she hasn't been moving as well as she usually does. Denies any associated symptoms or change in medical history since last visit and will follow up in 1 week.            Objective   Physical Exam  Constitutional:       General: She is not in acute distress.     Appearance: Normal appearance.       HENT:      Head: Normocephalic and atraumatic.   Musculoskeletal:      Cervical back: Decreased range of motion.      Lumbar back: Tenderness present. Decreased range of motion.   Neurological:      General: No focal deficit present.      Mental Status: She is alert and oriented to person, place, and time.      Cranial Nerves: No dysarthria or facial asymmetry.      Sensory: Sensation is intact.      Motor: Motor function is intact.      Coordination: Coordination is intact.      Gait: Gait is intact.   Psychiatric:         Attention and Perception: Attention normal.         Mood and Affect: Mood normal.         Speech: Speech normal.         Behavior: Behavior is cooperative.       Palpation of the following region(s) revealed:  Cervical: Levator scapulae right, hypertonicity and tenderness.  Cervical paraspinals bilateral, muscular hypertonicity.  Thoracic: Thoracic paraspinals right, hypertonicity and tenderness.  Lumbar: Lumbar paraspinals bilateral, hypertonicity and tenderness.  Gluteal  right, hypertonicity and tenderness.        Segmental Joint(s): Segmental joint dysfunction was assessed with motion palpation and is identified in the following areas:  Cervical : C1 C4 C6 C7  Thoracic : T1, T4, and T8  Lumbopelvic / Sacral SIJ : L5/S1, R SIJ, and L SIJ  Upper / Lower Extremities : R Hip, L Hip, L Knee, and L Fibular head       Assessment/Plan   Today's Treatment Included: Chiropractic manipulation to the Segmental Joint(s) Cervical : C1 C4 C6 C7 Segmental Joint(s) Lumbopelvic/Sacral SIJ : L5/S1, R SIJ, and L SIJ Segmental Joint(s) Thoracic : T1, T4, and T8 Segmental Joints Upper/Lower Extremities : R Hip, L Hip, L Knee, and L Fibular head  Treatment Techniques Used : Pelvic drop table technique, Manual traction, and Low force  Soft-Tissue manipulation    Soft-tissue mobilization was performed in the following areas:   Cervical paraspinal mm. bilateral and Levator Scap. right  Thoracic Paraspinal mm. right  Lumbar Paraspinal mm. bilateral and Gluteal mm. Glute. Max.  and Glute. Med. right            The patient tolerated today's treatment with little or no additional discomfort and was instructed to contact the office for questions or concerns.

## 2024-04-23 ENCOUNTER — ALLIED HEALTH (OUTPATIENT)
Dept: INTEGRATIVE MEDICINE | Facility: CLINIC | Age: 69
End: 2024-04-23
Payer: MEDICARE

## 2024-04-23 ENCOUNTER — APPOINTMENT (OUTPATIENT)
Dept: INTEGRATIVE MEDICINE | Facility: CLINIC | Age: 69
End: 2024-04-23
Payer: MEDICARE

## 2024-04-23 DIAGNOSIS — M54.2 CERVICALGIA: ICD-10-CM

## 2024-04-23 DIAGNOSIS — M99.02 SEGMENTAL AND SOMATIC DYSFUNCTION OF THORACIC REGION: ICD-10-CM

## 2024-04-23 DIAGNOSIS — M54.59 OTHER LOW BACK PAIN: ICD-10-CM

## 2024-04-23 DIAGNOSIS — M99.04 SEGMENTAL AND SOMATIC DYSFUNCTION OF SACRAL REGION: ICD-10-CM

## 2024-04-23 DIAGNOSIS — M99.01 SEGMENTAL AND SOMATIC DYSFUNCTION OF CERVICAL REGION: Primary | ICD-10-CM

## 2024-04-23 DIAGNOSIS — M79.10 MYALGIA: ICD-10-CM

## 2024-04-23 DIAGNOSIS — M53.9 MULTILEVEL DEGENERATIVE DISC DISEASE: ICD-10-CM

## 2024-04-23 PROCEDURE — 98941 CHIROPRACT MANJ 3-4 REGIONS: CPT | Performed by: CHIROPRACTOR

## 2024-04-23 ASSESSMENT — PAIN SCALES - GENERAL: PAINLEVEL: 2

## 2024-04-23 NOTE — PROGRESS NOTES
HPI Saumya Nicole is her today feeling somewhat better after her last chiropractic treatment.  Patient ID: Saumya Sutton is a 68 y.o. female who presents today for the examination and treatment of pain involving their  chief complaint(s) of Back Pain and Temporomandibular Joint Pain.             Review of Systems- reviewed            Objective    hysical Exam  Neuro- Vascular appears to be intact.  The patient is alert and oriented to person, place, and time with normal speech.  Cranial nerves are intact.   Cerebellar function is intact.   Memory appears to be normal and thought process is intact.   No gait abnormalities nor antalgic posture are appreciated.  No muscular clonus noted.   Otherwise no material changes from my initial exam           Assessment/Plan   Plan:  Today's Treatment Included: Chiropractic manipulation to the Segmental Joint(s) Cervical : C5 C6 Segmental Joint(s) Lumbopelvic/Sacral SIJ : L4, L5, and L5/S1 Segmental Joint(s) Thoracic : T3, T5, T6, and T7   Treatment Techniques Used : Diversified CMT, Pelvic blocking technique, Low velocity, Low amplitude, and Other: strain counter strain  Pin and stretch  Ischemic compression  Soft-Tissue manipulation        Treatment Plan : The patient and I discussed the risks and benefits of Chiropractic Care.  Based on the patient's subjective complaints along with the examination findings, it is advised that a course of Chiropractic Treatment by initiated in the form of:   as needed for symptom management  Consent for care was given both written and orally by the patient.  The patient tolerated today's treatment with little or no additional discomfort and was instructed to contact the office for questions or concerns.   Follow up as scheduled.

## 2024-04-26 ENCOUNTER — ALLIED HEALTH (OUTPATIENT)
Dept: INTEGRATIVE MEDICINE | Facility: CLINIC | Age: 69
End: 2024-04-26
Payer: MEDICARE

## 2024-04-26 DIAGNOSIS — M53.9 MULTILEVEL DEGENERATIVE DISC DISEASE: ICD-10-CM

## 2024-04-26 DIAGNOSIS — M99.04 SEGMENTAL AND SOMATIC DYSFUNCTION OF SACRAL REGION: ICD-10-CM

## 2024-04-26 DIAGNOSIS — M79.10 MYALGIA: ICD-10-CM

## 2024-04-26 DIAGNOSIS — M25.552 CHRONIC HIP PAIN, BILATERAL: ICD-10-CM

## 2024-04-26 DIAGNOSIS — G89.29 CHRONIC HIP PAIN, BILATERAL: ICD-10-CM

## 2024-04-26 DIAGNOSIS — M99.02 SEGMENTAL AND SOMATIC DYSFUNCTION OF THORACIC REGION: ICD-10-CM

## 2024-04-26 DIAGNOSIS — M25.551 CHRONIC HIP PAIN, BILATERAL: ICD-10-CM

## 2024-04-26 DIAGNOSIS — M99.01 SEGMENTAL AND SOMATIC DYSFUNCTION OF CERVICAL REGION: Primary | ICD-10-CM

## 2024-04-26 DIAGNOSIS — M54.2 CERVICALGIA: ICD-10-CM

## 2024-04-26 PROCEDURE — 98941 CHIROPRACT MANJ 3-4 REGIONS: CPT | Performed by: CHIROPRACTOR

## 2024-04-26 NOTE — PROGRESS NOTES
Subjective   Patient ID: Rose Sutton is a 69 y.o. female who presents April 26, 2024 for neck and low back pain.    No limit-Medicare    Today, the patient rates their degree of pain as a 8 out of 10 on the numeric pain rating scale.     ROSE GUADARRAMA returns for continued treatment of their neck/upper back pain and left hip discomfort. She states that she had improvement in symptoms after her last visit. She states that she had increased low back/buttock/hip after a treatment this past Tuesday. She states that she is having difficulty walking and finding a comfortable position. She states that she has been having bilateral shin pain, but she has been kneeling and cleaning out cabinets. Denies any associated symptoms or change in medical history since last visit and will follow up in 1 week.            Objective   Physical Exam  Constitutional:       General: She is not in acute distress.     Appearance: Normal appearance.       HENT:      Head: Normocephalic and atraumatic.   Musculoskeletal:      Cervical back: Decreased range of motion.      Lumbar back: Tenderness present. Decreased range of motion.   Neurological:      General: No focal deficit present.      Mental Status: She is alert and oriented to person, place, and time.      Cranial Nerves: No dysarthria or facial asymmetry.      Sensory: Sensation is intact.      Motor: Motor function is intact.      Coordination: Coordination is intact.      Gait: Gait is intact.   Psychiatric:         Attention and Perception: Attention normal.         Mood and Affect: Mood normal.         Speech: Speech normal.         Behavior: Behavior is cooperative.       Palpation of the following region(s) revealed:  Cervical: Levator scapulae right, hypertonicity and tenderness.  Cervical paraspinals bilateral, muscular hypertonicity.  Thoracic: Thoracic paraspinals right, hypertonicity and tenderness.  Lumbar: Lumbar paraspinals bilateral, hypertonicity and tenderness.  Gluteal  right, hypertonicity and tenderness.        Segmental Joint(s): Segmental joint dysfunction was assessed with motion palpation and is identified in the following areas:  Cervical : C1 C4 C6 C7  Thoracic : T1, T4, and T8  Lumbopelvic / Sacral SIJ : L5/S1, R SIJ, and L SIJ  Upper / Lower Extremities : R Hip, L Hip, L Knee, and L Fibular head       Assessment/Plan   Today's Treatment Included: Chiropractic manipulation to the Segmental Joint(s) Cervical : C1 C4 C6 C7 Segmental Joint(s) Lumbopelvic/Sacral SIJ : L5/S1, R SIJ, and L SIJ Segmental Joint(s) Thoracic : T1, T4, and T8 Segmental Joints Upper/Lower Extremities : R Hip, L Hip, L Knee, and L Fibular head  Treatment Techniques Used : Pelvic drop table technique, Manual traction, and Low force  Soft-Tissue manipulation    Soft-tissue mobilization was performed in the following areas:   Cervical paraspinal mm. bilateral and Levator Scap. right  Thoracic Paraspinal mm. right  Lumbar Paraspinal mm. bilateral and Gluteal mm. Glute. Max.  and Glute. Med. right            The patient tolerated today's treatment with little or no additional discomfort and was instructed to contact the office for questions or concerns.

## 2024-05-01 ENCOUNTER — ALLIED HEALTH (OUTPATIENT)
Dept: INTEGRATIVE MEDICINE | Facility: CLINIC | Age: 69
End: 2024-05-01

## 2024-05-01 ENCOUNTER — APPOINTMENT (OUTPATIENT)
Dept: INTEGRATIVE MEDICINE | Facility: CLINIC | Age: 69
End: 2024-05-01
Payer: MEDICARE

## 2024-05-01 ENCOUNTER — ALLIED HEALTH (OUTPATIENT)
Dept: INTEGRATIVE MEDICINE | Facility: CLINIC | Age: 69
End: 2024-05-01
Payer: MEDICARE

## 2024-05-01 DIAGNOSIS — M54.2 CERVICALGIA: ICD-10-CM

## 2024-05-01 DIAGNOSIS — M25.552 CHRONIC HIP PAIN, BILATERAL: ICD-10-CM

## 2024-05-01 DIAGNOSIS — M99.02 SEGMENTAL AND SOMATIC DYSFUNCTION OF THORACIC REGION: ICD-10-CM

## 2024-05-01 DIAGNOSIS — M25.551 CHRONIC HIP PAIN, BILATERAL: Primary | ICD-10-CM

## 2024-05-01 DIAGNOSIS — G89.29 CHRONIC HIP PAIN, BILATERAL: ICD-10-CM

## 2024-05-01 DIAGNOSIS — M53.9 MULTILEVEL DEGENERATIVE DISC DISEASE: ICD-10-CM

## 2024-05-01 DIAGNOSIS — M79.10 MYALGIA: ICD-10-CM

## 2024-05-01 DIAGNOSIS — M99.04 SEGMENTAL AND SOMATIC DYSFUNCTION OF SACRAL REGION: ICD-10-CM

## 2024-05-01 DIAGNOSIS — G89.29 CHRONIC HIP PAIN, BILATERAL: Primary | ICD-10-CM

## 2024-05-01 DIAGNOSIS — M25.552 CHRONIC HIP PAIN, BILATERAL: Primary | ICD-10-CM

## 2024-05-01 DIAGNOSIS — M99.01 SEGMENTAL AND SOMATIC DYSFUNCTION OF CERVICAL REGION: Primary | ICD-10-CM

## 2024-05-01 DIAGNOSIS — M25.551 CHRONIC HIP PAIN, BILATERAL: ICD-10-CM

## 2024-05-01 PROCEDURE — 97139 UNLISTED THERAPEUTIC PX: CPT | Performed by: ACUPUNCTURIST

## 2024-05-01 PROCEDURE — 98941 CHIROPRACT MANJ 3-4 REGIONS: CPT | Performed by: CHIROPRACTOR

## 2024-05-01 NOTE — PROGRESS NOTES
Subjective   Patient ID: Saumya Sutton is a 69 y.o. female who presents May 1, 2024 for neck and low back pain.    No limit-Medicare    Today, the patient rates their degree of pain as a 5 out of 10 on the numeric pain rating scale.     Saumya Nicole returns for continued treatment of their neck/upper back pain and left hip discomfort. She states that she had less bilateral hip pain after last treatment for several days. She states that she started doing planks and pelvic tilts the beginning of this week and thinks that could have increased hip symptoms she has today. She states that shin pain has dissipated. She states that neck/upper back has improved. Denies any associated symptoms or change in medical history since last visit and will follow up in 1 week.            Objective   Physical Exam  Constitutional:       General: She is not in acute distress.     Appearance: Normal appearance.       HENT:      Head: Normocephalic and atraumatic.   Musculoskeletal:      Cervical back: Decreased range of motion.      Lumbar back: Tenderness present. Decreased range of motion.   Neurological:      General: No focal deficit present.      Mental Status: She is alert and oriented to person, place, and time.      Cranial Nerves: No dysarthria or facial asymmetry.      Sensory: Sensation is intact.      Motor: Motor function is intact.      Coordination: Coordination is intact.      Gait: Gait is intact.   Psychiatric:         Attention and Perception: Attention normal.         Mood and Affect: Mood normal.         Speech: Speech normal.         Behavior: Behavior is cooperative.       Palpation of the following region(s) revealed:  Cervical: Levator scapulae right, hypertonicity and tenderness.  Cervical paraspinals bilateral, muscular hypertonicity.  Thoracic: Thoracic paraspinals right, hypertonicity and tenderness.  Lumbar: Lumbar paraspinals bilateral, hypertonicity and tenderness.  Gluteal right, hypertonicity and  tenderness.        Segmental Joint(s): Segmental joint dysfunction was assessed with motion palpation and is identified in the following areas:  Cervical : C1 C4 C6 C7  Thoracic : T1, T4, and T8  Lumbopelvic / Sacral SIJ : L5/S1, R SIJ, and L SIJ  Upper / Lower Extremities : R Hip, L Hip, L Knee, and L Fibular head       Assessment/Plan   Today's Treatment Included: Chiropractic manipulation to the Segmental Joint(s) Cervical : C1 C4 C6 C7 Segmental Joint(s) Lumbopelvic/Sacral SIJ : L5/S1, R SIJ, and L SIJ Segmental Joint(s) Thoracic : T1, T4, and T8 Segmental Joints Upper/Lower Extremities : R Hip, L Hip, L Knee, and L Fibular head  Treatment Techniques Used : Pelvic drop table technique, Manual traction, and Low force  Soft-Tissue manipulation    Soft-tissue mobilization was performed in the following areas:   Cervical paraspinal mm. bilateral and Levator Scap. right  Thoracic Paraspinal mm. right  Lumbar Paraspinal mm. bilateral and Gluteal mm. Glute. Max.  and Glute. Med. right            The patient tolerated today's treatment with little or no additional discomfort and was instructed to contact the office for questions or concerns.

## 2024-05-01 NOTE — PROGRESS NOTES
Acupuncture Visit:     Subjective   Patient ID: Saumya Sutton is a 69 y.o. female who presents for No chief complaint on file.  Felt relief in bilateral hip pain post tx.  Now has back pain due to doing planks.      Previous:  Pt seeking support for bilateral hip and groin pain related to overstretching           Session Information  Is this acupuncture treatment being billed to the patient's insurance company: No  Visit Type: Follow-up visit         Review of Systems         Provider reviewed plan for the acupuncture session, precautions and contraindications. Patient/guardian/hospital staff has given consent to treat with full understanding of what to expect during the session. Before acupuncture began, provider explained to the patient to communicate at any time if the procedure was causing discomfort past their tolerance level. Patient agreed to advise acupuncturist. The acupuncturist counseled the patient on the risks of acupuncture treatment including pain, infection, bleeding, and no relief of pain. The patient was positioned comfortably. There was no evidence of infection at the site of needle insertions.    Objective   Physical Exam              Acupuncture Treatment  Body Points - Left: gb 41, k 7, 10  Body Points - Bilateral: inner yin,gb 39, 34, k27, st qix1  Body Points - Right: sj5, k 6  Needle Count In: 15  Needle Count Out: 15  Needle Retention Time (min): 25 minutes  Total Face to Face Time (min): 25 minutes              Assessment/Plan

## 2024-05-22 ENCOUNTER — ALLIED HEALTH (OUTPATIENT)
Dept: INTEGRATIVE MEDICINE | Facility: CLINIC | Age: 69
End: 2024-05-22
Payer: MEDICARE

## 2024-05-22 ENCOUNTER — APPOINTMENT (OUTPATIENT)
Dept: INTEGRATIVE MEDICINE | Facility: CLINIC | Age: 69
End: 2024-05-22
Payer: MEDICARE

## 2024-05-22 DIAGNOSIS — M99.04 SEGMENTAL AND SOMATIC DYSFUNCTION OF SACRAL REGION: ICD-10-CM

## 2024-05-22 DIAGNOSIS — R10.31 BILATERAL GROIN PAIN: Primary | ICD-10-CM

## 2024-05-22 DIAGNOSIS — M25.552 CHRONIC HIP PAIN, BILATERAL: ICD-10-CM

## 2024-05-22 DIAGNOSIS — R10.32 BILATERAL GROIN PAIN: Primary | ICD-10-CM

## 2024-05-22 DIAGNOSIS — M25.551 CHRONIC HIP PAIN, BILATERAL: ICD-10-CM

## 2024-05-22 DIAGNOSIS — M99.02 SEGMENTAL AND SOMATIC DYSFUNCTION OF THORACIC REGION: ICD-10-CM

## 2024-05-22 DIAGNOSIS — M99.01 SEGMENTAL AND SOMATIC DYSFUNCTION OF CERVICAL REGION: Primary | ICD-10-CM

## 2024-05-22 DIAGNOSIS — M79.10 MYALGIA: ICD-10-CM

## 2024-05-22 DIAGNOSIS — M53.9 MULTILEVEL DEGENERATIVE DISC DISEASE: ICD-10-CM

## 2024-05-22 DIAGNOSIS — M54.2 CERVICALGIA: ICD-10-CM

## 2024-05-22 DIAGNOSIS — G89.29 CHRONIC HIP PAIN, BILATERAL: ICD-10-CM

## 2024-05-22 PROCEDURE — 98941 CHIROPRACT MANJ 3-4 REGIONS: CPT | Performed by: CHIROPRACTOR

## 2024-05-22 PROCEDURE — 97139 UNLISTED THERAPEUTIC PX: CPT | Performed by: ACUPUNCTURIST

## 2024-05-22 NOTE — PROGRESS NOTES
Subjective   Patient ID: Saumya Sutton is a 69 y.o. female who presents May 22, 2024 for neck and low back pain.    No limit-Medicare    Today, the patient rates their degree of pain as a 2 out of 10 on the numeric pain rating scale.     Saumya Nicole returns for continued treatment of their neck/upper back pain and left hip discomfort. She states that she is doing well today. She states that she currently does not have any pain. She states that she has mostly had occasional anterior pelvic pain since last visit. She states that she was having pain this morning but after sitting for 10 minutes, symptoms dissipated. Denies any associated symptoms or change in medical history since last visit and will follow up in 1 week.            Objective   Physical Exam  Constitutional:       General: She is not in acute distress.     Appearance: Normal appearance.       HENT:      Head: Normocephalic and atraumatic.   Musculoskeletal:      Cervical back: Decreased range of motion.      Lumbar back: Tenderness present. Decreased range of motion.   Neurological:      General: No focal deficit present.      Mental Status: She is alert and oriented to person, place, and time.      Cranial Nerves: No dysarthria or facial asymmetry.      Sensory: Sensation is intact.      Motor: Motor function is intact.      Coordination: Coordination is intact.      Gait: Gait is intact.   Psychiatric:         Attention and Perception: Attention normal.         Mood and Affect: Mood normal.         Speech: Speech normal.         Behavior: Behavior is cooperative.       Palpation of the following region(s) revealed:  Cervical: Levator scapulae right, hypertonicity and tenderness.  Cervical paraspinals bilateral, muscular hypertonicity.  Thoracic: Thoracic paraspinals right, hypertonicity and tenderness.  Lumbar: Lumbar paraspinals bilateral, hypertonicity and tenderness.  Gluteal right, hypertonicity and tenderness.        Segmental Joint(s): Segmental  joint dysfunction was assessed with motion palpation and is identified in the following areas:  Cervical : C1 C4 C6 C7  Thoracic : T1, T4, and T8  Lumbopelvic / Sacral SIJ : L5/S1, R SIJ, and L SIJ  Upper / Lower Extremities : R Hip, L Hip, L Knee, and L Fibular head       Assessment/Plan   Today's Treatment Included: Chiropractic manipulation to the Segmental Joint(s) Cervical : C1 C4 C6 C7 Segmental Joint(s) Lumbopelvic/Sacral SIJ : L5/S1, R SIJ, and L SIJ Segmental Joint(s) Thoracic : T1, T4, and T8 Segmental Joints Upper/Lower Extremities : R Hip, L Hip, L Knee, and L Fibular head  Treatment Techniques Used : Pelvic drop table technique, Manual traction, and Low force  Soft-Tissue manipulation    Soft-tissue mobilization was performed in the following areas:   Cervical paraspinal mm. bilateral and Levator Scap. right  Thoracic Paraspinal mm. right  Lumbar Paraspinal mm. bilateral and Gluteal mm. Glute. Max.  and Glute. Med. right            The patient tolerated today's treatment with little or no additional discomfort and was instructed to contact the office for questions or concerns.

## 2024-05-22 NOTE — PROGRESS NOTES
Acupuncture Visit:     Subjective   Patient ID: Saumya Sutton is a 69 y.o. female who presents for No chief complaint on file.  Seeking continued support for groin pain.  She shared discomfort is decreasing but there still is underlying tightness        Session Information  Is this acupuncture treatment being billed to the patient's insurance company: No  Visit Type: Follow-up visit         Review of Systems         Provider reviewed plan for the acupuncture session, precautions and contraindications. Patient/guardian/hospital staff has given consent to treat with full understanding of what to expect during the session. Before acupuncture began, provider explained to the patient to communicate at any time if the procedure was causing discomfort past their tolerance level. Patient agreed to advise acupuncturist. The acupuncturist counseled the patient on the risks of acupuncture treatment including pain, infection, bleeding, and no relief of pain. The patient was positioned comfortably. There was no evidence of infection at the site of needle insertions.    Objective   Physical Exam              Acupuncture Treatment  Patient Position: Seated and reclining  Needle Guage: 40 guage /.16/ Red seirin, 36 guage /.20/ Blue seirin  Body Points - Left: ruddy 5 lr 4  Body Points - Bilateral: sp 9, st qi x2 inner yin adductor  Body Points - Right: immunex1  Needle Count In: 13  Needle Count Out: 13  Needle Retention Time (min): 25 minutes  Total Face to Face Time (min): 25 minutes              Assessment/Plan

## 2024-06-04 ENCOUNTER — APPOINTMENT (OUTPATIENT)
Dept: INTEGRATIVE MEDICINE | Facility: CLINIC | Age: 69
End: 2024-06-04
Payer: MEDICARE

## 2024-06-19 ENCOUNTER — APPOINTMENT (OUTPATIENT)
Dept: INTEGRATIVE MEDICINE | Facility: CLINIC | Age: 69
End: 2024-06-19
Payer: MEDICARE

## 2024-06-19 ENCOUNTER — APPOINTMENT (OUTPATIENT)
Dept: INTEGRATIVE MEDICINE | Facility: CLINIC | Age: 69
End: 2024-06-19

## 2024-06-19 DIAGNOSIS — M79.604 LEG PAIN, BILATERAL: Primary | ICD-10-CM

## 2024-06-19 DIAGNOSIS — M54.2 CERVICALGIA: ICD-10-CM

## 2024-06-19 DIAGNOSIS — M25.551 CHRONIC HIP PAIN, BILATERAL: ICD-10-CM

## 2024-06-19 DIAGNOSIS — M53.9 MULTILEVEL DEGENERATIVE DISC DISEASE: ICD-10-CM

## 2024-06-19 DIAGNOSIS — M99.04 SEGMENTAL AND SOMATIC DYSFUNCTION OF SACRAL REGION: ICD-10-CM

## 2024-06-19 DIAGNOSIS — M79.605 LEG PAIN, BILATERAL: Primary | ICD-10-CM

## 2024-06-19 DIAGNOSIS — M79.10 MYALGIA: ICD-10-CM

## 2024-06-19 DIAGNOSIS — M99.02 SEGMENTAL AND SOMATIC DYSFUNCTION OF THORACIC REGION: ICD-10-CM

## 2024-06-19 DIAGNOSIS — M25.552 CHRONIC HIP PAIN, BILATERAL: ICD-10-CM

## 2024-06-19 DIAGNOSIS — M99.01 SEGMENTAL AND SOMATIC DYSFUNCTION OF CERVICAL REGION: Primary | ICD-10-CM

## 2024-06-19 DIAGNOSIS — G89.29 CHRONIC HIP PAIN, BILATERAL: ICD-10-CM

## 2024-06-19 PROCEDURE — 97139 UNLISTED THERAPEUTIC PX: CPT | Performed by: ACUPUNCTURIST

## 2024-06-19 PROCEDURE — 98941 CHIROPRACT MANJ 3-4 REGIONS: CPT | Performed by: CHIROPRACTOR

## 2024-06-19 NOTE — PROGRESS NOTES
Acupuncture Visit:     Subjective   Patient ID: Saumya Sutton is a 69 y.o. female who presents for No chief complaint on file.  Pt being seen post chiropractic tx.  She is seeking support for bilateral lateral leg pain.          Session Information  Is this acupuncture treatment being billed to the patient's insurance company: No  Visit Type: Follow-up visit         Review of Systems         Provider reviewed plan for the acupuncture session, precautions and contraindications. Patient/guardian/hospital staff has given consent to treat with full understanding of what to expect during the session. Before acupuncture began, provider explained to the patient to communicate at any time if the procedure was causing discomfort past their tolerance level. Patient agreed to advise acupuncturist. The acupuncturist counseled the patient on the risks of acupuncture treatment including pain, infection, bleeding, and no relief of pain. The patient was positioned comfortably. There was no evidence of infection at the site of needle insertions.    Objective   Physical Exam         Treatment Plan  Treatment Goals: Pain management    Acupuncture Treatment  Patient Position: Seated and reclining  Needle Guage: 40 guage /.16/ Red seirin, 36 guage /.20/ Blue seirin, 42 guage /.14/ Lime green seirin  Body Points - Bilateral: sp 10, 3.2, st qi x1, gb 33, 34, 38, ,piriformis release  Needle Count In: 14  Needle Count Out: 14  Needle Retention Time (min): 25 minutes  Total Face to Face Time (min): 25 minutes              Assessment/Plan

## 2024-06-19 NOTE — PROGRESS NOTES
Subjective   Patient ID: Saumya Sutton is a 69 y.o. female who presents June 19, 2024 for neck and low back pain.    No limit-Medicare    Today, the patient rates their degree of pain as a 2 out of 10 on the numeric pain rating scale.     Saumya Nicole returns for continued treatment of their neck/upper back pain and left hip discomfort. She states that she has been doing better. She has had less hip and low back pain, as well as neck and mid back discomfort. She states that she has been having bilateral lateral calf pain, mostly when she is lying down in bed at night. Denies any associated symptoms or change in medical history since last visit and will follow up in 1 week.            Objective   Physical Exam  Constitutional:       General: She is not in acute distress.     Appearance: Normal appearance.       HENT:      Head: Normocephalic and atraumatic.   Musculoskeletal:      Cervical back: Decreased range of motion.      Lumbar back: Tenderness present. Decreased range of motion.   Neurological:      General: No focal deficit present.      Mental Status: She is alert and oriented to person, place, and time.      Cranial Nerves: No dysarthria or facial asymmetry.      Sensory: Sensation is intact.      Motor: Motor function is intact.      Coordination: Coordination is intact.      Gait: Gait is intact.   Psychiatric:         Attention and Perception: Attention normal.         Mood and Affect: Mood normal.         Speech: Speech normal.         Behavior: Behavior is cooperative.         Palpation of the following region(s) revealed:  Cervical: Levator scapulae right, hypertonicity and tenderness.  Cervical paraspinals bilateral, muscular hypertonicity.  Thoracic: Thoracic paraspinals right, hypertonicity and tenderness.  Lumbar: Lumbar paraspinals bilateral, hypertonicity and tenderness.  Gluteal right, hypertonicity and tenderness.        Segmental Joint(s): Segmental joint dysfunction was assessed with motion  palpation and is identified in the following areas:  Cervical : C1 C4 C6 C7  Thoracic : T1, T4, and T8  Lumbopelvic / Sacral SIJ : L5/S1, R SIJ, and L SIJ  Upper / Lower Extremities : R Hip, L Hip, R Fibular head, and L Fibular head       Assessment/Plan   Today's Treatment Included: Chiropractic manipulation to the Segmental Joint(s) Cervical : C1 C4 C6 C7 Segmental Joint(s) Lumbopelvic/Sacral SIJ : L5/S1, R SIJ, and L SIJ Segmental Joint(s) Thoracic : T1, T4, and T8 Segmental Joints Upper/Lower Extremities : R Hip, L Hip, R Fibular head, and L Fibular head  Treatment Techniques Used : Pelvic drop table technique, Manual traction, and Low force  Soft-Tissue manipulation    Soft-tissue mobilization was performed in the following areas:   Cervical paraspinal mm. bilateral and Levator Scap. right  Thoracic Paraspinal mm. right  Lumbar Paraspinal mm. bilateral and Gluteal mm. Glute. Max.  and Glute. Med. right            The patient tolerated today's treatment with little or no additional discomfort and was instructed to contact the office for questions or concerns.    no

## 2024-06-30 PROBLEM — M54.59 OTHER LOW BACK PAIN: Status: ACTIVE | Noted: 2024-06-30

## 2024-07-16 ENCOUNTER — APPOINTMENT (OUTPATIENT)
Dept: INTEGRATIVE MEDICINE | Facility: CLINIC | Age: 69
End: 2024-07-16
Payer: MEDICARE

## 2024-07-17 ENCOUNTER — APPOINTMENT (OUTPATIENT)
Dept: INTEGRATIVE MEDICINE | Facility: CLINIC | Age: 69
End: 2024-07-17
Payer: MEDICARE

## 2024-07-17 ENCOUNTER — APPOINTMENT (OUTPATIENT)
Dept: INTEGRATIVE MEDICINE | Facility: CLINIC | Age: 69
End: 2024-07-17

## 2024-07-17 DIAGNOSIS — M99.04 SEGMENTAL AND SOMATIC DYSFUNCTION OF SACRAL REGION: ICD-10-CM

## 2024-07-17 DIAGNOSIS — M99.02 SEGMENTAL AND SOMATIC DYSFUNCTION OF THORACIC REGION: ICD-10-CM

## 2024-07-17 DIAGNOSIS — M25.551 CHRONIC HIP PAIN, BILATERAL: ICD-10-CM

## 2024-07-17 DIAGNOSIS — M54.2 CERVICALGIA: ICD-10-CM

## 2024-07-17 DIAGNOSIS — M99.01 SEGMENTAL AND SOMATIC DYSFUNCTION OF CERVICAL REGION: Primary | ICD-10-CM

## 2024-07-17 DIAGNOSIS — M25.551 CHRONIC HIP PAIN, BILATERAL: Primary | ICD-10-CM

## 2024-07-17 DIAGNOSIS — M25.552 CHRONIC HIP PAIN, BILATERAL: Primary | ICD-10-CM

## 2024-07-17 DIAGNOSIS — M99.03 SEGMENTAL AND SOMATIC DYSFUNCTION OF LUMBAR REGION: ICD-10-CM

## 2024-07-17 DIAGNOSIS — G89.29 CHRONIC HIP PAIN, BILATERAL: Primary | ICD-10-CM

## 2024-07-17 DIAGNOSIS — G89.29 CHRONIC HIP PAIN, BILATERAL: ICD-10-CM

## 2024-07-17 DIAGNOSIS — M79.10 MYALGIA: ICD-10-CM

## 2024-07-17 DIAGNOSIS — M53.9 MULTILEVEL DEGENERATIVE DISC DISEASE: ICD-10-CM

## 2024-07-17 DIAGNOSIS — M25.552 CHRONIC HIP PAIN, BILATERAL: ICD-10-CM

## 2024-07-17 PROCEDURE — 97139 UNLISTED THERAPEUTIC PX: CPT | Performed by: ACUPUNCTURIST

## 2024-07-17 PROCEDURE — 98941 CHIROPRACT MANJ 3-4 REGIONS: CPT | Performed by: CHIROPRACTOR

## 2024-07-17 NOTE — PROGRESS NOTES
Subjective   Patient ID: Saumya Sutton is a 69 y.o. female who presents July 17, 2024 for neck and low back pain.    No limit-Medicare    Today, the patient rates their degree of pain as a 1 out of 10 on the numeric pain rating scale.     Saumya Nicole returns for continued treatment of their neck/upper back pain and left hip discomfort. She states that she has been well. She states that she sold her old house and that has reduced a significant amount of stress. She states that she stopped taking the statin and her bilateral calf pain has dissipated. She states that recently she has the most discomfort while standing for extended periods of time. Denies any associated symptoms or change in medical history since last visit and will follow up in 1 month.            Objective   Physical Exam  Constitutional:       General: She is not in acute distress.     Appearance: Normal appearance.       HENT:      Head: Normocephalic and atraumatic.   Musculoskeletal:      Cervical back: Decreased range of motion.      Lumbar back: Tenderness present. Decreased range of motion.   Neurological:      General: No focal deficit present.      Mental Status: She is alert and oriented to person, place, and time.      Cranial Nerves: No dysarthria or facial asymmetry.      Sensory: Sensation is intact.      Motor: Motor function is intact.      Coordination: Coordination is intact.      Gait: Gait is intact.   Psychiatric:         Attention and Perception: Attention normal.         Mood and Affect: Mood normal.         Speech: Speech normal.         Behavior: Behavior is cooperative.       Palpation of the following region(s) revealed:  Cervical: Levator scapulae right, hypertonicity and tenderness.  Cervical paraspinals bilateral, muscular hypertonicity.  Thoracic: Thoracic paraspinals right, hypertonicity and tenderness.  Lumbar: Lumbar paraspinals bilateral, hypertonicity and tenderness.  Gluteal right, hypertonicity and  tenderness.        Segmental Joint(s): Segmental joint dysfunction was assessed with motion palpation and is identified in the following areas:  Cervical : C1 C4 C6 C7  Thoracic : T1, T4, and T8  Lumbopelvic / Sacral SIJ : L5/S1, R SIJ, and L SIJ  Upper / Lower Extremities : R Hip, L Hip, R Fibular head, and L Fibular head       Assessment/Plan   Today's Treatment Included: Chiropractic manipulation to the Segmental Joint(s) Cervical : C1 C4 C6 C7 Segmental Joint(s) Lumbopelvic/Sacral SIJ : L5/S1, R SIJ, and L SIJ Segmental Joint(s) Thoracic : T1, T4, and T8 Segmental Joints Upper/Lower Extremities : R Hip, L Hip, R Fibular head, and L Fibular head  Treatment Techniques Used : Pelvic drop table technique, Manual traction, and Low force  Soft-Tissue manipulation    Soft-tissue mobilization was performed in the following areas:   Cervical paraspinal mm. bilateral and Levator Scap. right  Thoracic Paraspinal mm. right  Lumbar Paraspinal mm. bilateral and Gluteal mm. Glute. Max.  and Glute. Med. right      Patient was recommended to rest one foot on a stool while standing to reduce low back discomfort.      The patient tolerated today's treatment with little or no additional discomfort and was instructed to contact the office for questions or concerns.

## 2024-07-17 NOTE — PROGRESS NOTES
Acupuncture Visit:     Subjective   Patient ID: Saumya Sutton is a 69 y.o. female who presents for No chief complaint on file.    Pt reported she id doing well.  Experiencing bilateral lateral hip pain  Previous:  Pt being seen post chiropractic tx.  She is seeking support for bilateral lateral leg pain.          Session Information  Is this acupuncture treatment being billed to the patient's insurance company: No  Visit Type: Follow-up visit         Review of Systems         Provider reviewed plan for the acupuncture session, precautions and contraindications. Patient/guardian/hospital staff has given consent to treat with full understanding of what to expect during the session. Before acupuncture began, provider explained to the patient to communicate at any time if the procedure was causing discomfort past their tolerance level. Patient agreed to advise acupuncturist. The acupuncturist counseled the patient on the risks of acupuncture treatment including pain, infection, bleeding, and no relief of pain. The patient was positioned comfortably. There was no evidence of infection at the site of needle insertions.    Objective   Physical Exam    Acupuncture Physical Exam  Tongue Color: Pale body  Tongue Shape: Puffy, Scalloped edges    Treatment Plan  Treatment Goals: Pain management    Acupuncture Treatment  Body Points - Bilateral: piriformis release, st qix1 , gb 34, sp 6, gb 41, sj5,  Needle Count In: 12  Needle Count Out: 12  Needle Retention Time (min): 25 minutes  Total Face to Face Time (min): 25 minutes              Assessment/Plan

## 2024-08-07 ENCOUNTER — APPOINTMENT (OUTPATIENT)
Dept: INTEGRATIVE MEDICINE | Facility: CLINIC | Age: 69
End: 2024-08-07
Payer: MEDICARE

## 2024-08-16 ENCOUNTER — APPOINTMENT (OUTPATIENT)
Dept: INTEGRATIVE MEDICINE | Facility: CLINIC | Age: 69
End: 2024-08-16
Payer: MEDICARE

## 2024-08-16 DIAGNOSIS — M79.10 MYALGIA: ICD-10-CM

## 2024-08-16 DIAGNOSIS — M25.551 CHRONIC HIP PAIN, BILATERAL: ICD-10-CM

## 2024-08-16 DIAGNOSIS — M54.2 CERVICALGIA: ICD-10-CM

## 2024-08-16 DIAGNOSIS — M99.04 SEGMENTAL AND SOMATIC DYSFUNCTION OF SACRAL REGION: ICD-10-CM

## 2024-08-16 DIAGNOSIS — M99.01 SEGMENTAL AND SOMATIC DYSFUNCTION OF CERVICAL REGION: Primary | ICD-10-CM

## 2024-08-16 DIAGNOSIS — M25.552 CHRONIC HIP PAIN, BILATERAL: ICD-10-CM

## 2024-08-16 DIAGNOSIS — G89.29 CHRONIC HIP PAIN, BILATERAL: ICD-10-CM

## 2024-08-16 DIAGNOSIS — M99.02 SEGMENTAL AND SOMATIC DYSFUNCTION OF THORACIC REGION: ICD-10-CM

## 2024-08-16 DIAGNOSIS — M53.9 MULTILEVEL DEGENERATIVE DISC DISEASE: ICD-10-CM

## 2024-08-16 DIAGNOSIS — M99.03 SEGMENTAL AND SOMATIC DYSFUNCTION OF LUMBAR REGION: ICD-10-CM

## 2024-08-16 PROCEDURE — 98941 CHIROPRACT MANJ 3-4 REGIONS: CPT | Performed by: CHIROPRACTOR

## 2024-08-16 NOTE — PROGRESS NOTES
Subjective   Patient ID: Saumya Sutton is a 69 y.o. female who presents August 16, 2024 for neck and low back pain.    No limit-Medicare    Today, the patient rates their degree of pain as a 1 out of 10 on the numeric pain rating scale.     Saumya Nicole returns for continued treatment of their neck/upper back pain and left hip discomfort. She states that she has been doing well. She states that she walk around the festival yesterday for several hours and that has mildly increased hip and knee discomfort. She states that she has been having right lateral ankle discomfort for about a week or so. Denies any associated symptoms or change in medical history since last visit and will follow up in 1 month.            Objective   Physical Exam  Constitutional:       General: She is not in acute distress.     Appearance: Normal appearance.       HENT:      Head: Normocephalic and atraumatic.   Musculoskeletal:      Cervical back: Decreased range of motion.      Lumbar back: Tenderness present. Decreased range of motion.   Neurological:      General: No focal deficit present.      Mental Status: She is alert and oriented to person, place, and time.      Cranial Nerves: No dysarthria or facial asymmetry.      Sensory: Sensation is intact.      Motor: Motor function is intact.      Coordination: Coordination is intact.      Gait: Gait is intact.   Psychiatric:         Attention and Perception: Attention normal.         Mood and Affect: Mood normal.         Speech: Speech normal.         Behavior: Behavior is cooperative.       Palpation of the following region(s) revealed:  Cervical: Levator scapulae right, hypertonicity and tenderness.  Cervical paraspinals bilateral, muscular hypertonicity.  Thoracic: Thoracic paraspinals right, hypertonicity and tenderness.  Lumbar: Lumbar paraspinals bilateral, hypertonicity and tenderness.  Gluteal right, hypertonicity and tenderness.        Segmental Joint(s): Segmental joint dysfunction  was assessed with motion palpation and is identified in the following areas:  Cervical : C1 C4 C6 C7  Thoracic : T1, T4, and T8  Lumbopelvic / Sacral SIJ : L5/S1, R SIJ, and L SIJ  Upper / Lower Extremities : R Hip, L Hip, R Ankle, R Fibular head, and L Fibular head       Assessment/Plan   Today's Treatment Included: Chiropractic manipulation to the Segmental Joint(s) Cervical : C1 C4 C6 C7 Segmental Joint(s) Lumbopelvic/Sacral SIJ : L5/S1, R SIJ, and L SIJ Segmental Joint(s) Thoracic : T1, T4, and T8 Segmental Joints Upper/Lower Extremities : R Hip, L Hip, R Ankle, R Fibular head, and L Fibular head  Treatment Techniques Used : Pelvic drop table technique, Manual traction, and Low force  Soft-Tissue manipulation    Soft-tissue mobilization was performed in the following areas:   Cervical paraspinal mm. bilateral and Levator Scap. right  Thoracic Paraspinal mm. right  Lumbar Paraspinal mm. bilateral and Gluteal mm. Glute. Max.  and Glute. Med. right      Patient was recommended to rest one foot on a stool while standing to reduce low back discomfort.      The patient tolerated today's treatment with little or no additional discomfort and was instructed to contact the office for questions or concerns.

## 2024-09-03 ENCOUNTER — ALLIED HEALTH (OUTPATIENT)
Dept: INTEGRATIVE MEDICINE | Facility: CLINIC | Age: 69
End: 2024-09-03
Payer: MEDICARE

## 2024-09-03 DIAGNOSIS — M53.9 MULTILEVEL DEGENERATIVE DISC DISEASE: ICD-10-CM

## 2024-09-03 DIAGNOSIS — M54.2 CERVICALGIA: ICD-10-CM

## 2024-09-03 DIAGNOSIS — M99.02 SEGMENTAL AND SOMATIC DYSFUNCTION OF THORACIC REGION: ICD-10-CM

## 2024-09-03 DIAGNOSIS — M99.01 SEGMENTAL AND SOMATIC DYSFUNCTION OF CERVICAL REGION: Primary | ICD-10-CM

## 2024-09-03 DIAGNOSIS — R42 CERVICAL VERTIGO: ICD-10-CM

## 2024-09-03 PROCEDURE — 98940 CHIROPRACT MANJ 1-2 REGIONS: CPT | Performed by: CHIROPRACTOR

## 2024-09-03 NOTE — PROGRESS NOTES
Subjective   Patient ID: Saumya Sutton is a 69 y.o. female who presents September 3, 2024 for neck and low back pain.    No limit-Medicare    Today, the patient rates their degree of pain as a 3 out of 10 on the numeric pain rating scale.     Saumya Nicole returns for treatment of vertigo. She states that she woke up on Sunday with vertigo and dizziness. She states that she spent most of the day in bed and symptoms started to dissipate. She states that she had much less dizziness yesterday but woke up with increased symptoms this morning. She states that she has dizziness when changing position or with most movements of the neck, especially looking to the left. She states that she has been slightly nauseous but has not vomited. Denies any associated symptoms or change in medical history since last visit and will follow up in 1 week.          Objective   Physical Exam  Constitutional:       General: She is not in acute distress.     Appearance: Normal appearance.       HENT:      Head: Normocephalic and atraumatic.   Musculoskeletal:      Cervical back: Decreased range of motion.      Lumbar back: Tenderness present. Decreased range of motion.   Neurological:      General: No focal deficit present.      Mental Status: She is alert and oriented to person, place, and time.      Cranial Nerves: No dysarthria or facial asymmetry.      Sensory: Sensation is intact.      Motor: Motor function is intact.      Coordination: Coordination is intact.      Gait: Gait is intact.   Psychiatric:         Attention and Perception: Attention normal.         Mood and Affect: Mood normal.         Speech: Speech normal.         Behavior: Behavior is cooperative.       Palpation of the following region(s) revealed:  Cervical: Levator scapulae right, hypertonicity and tenderness.  Cervical paraspinals bilateral, muscular hypertonicity.  Thoracic: Thoracic paraspinals right, hypertonicity and tenderness.  Lumbar: Lumbar paraspinals bilateral,  hypertonicity and tenderness.  Gluteal right, hypertonicity and tenderness.        Segmental Joint(s): Segmental joint dysfunction was assessed with motion palpation and is identified in the following areas:  Cervical : C1 C2 C4 C6 C7  Thoracic : T1       Assessment/Plan   Today's Treatment Included: Chiropractic manipulation to the Segmental Joint(s) Cervical : C1 C2 C4 C6 C7  Segmental Joint(s) Thoracic : T1   Treatment Techniques Used : Manual traction and Low force  Soft-Tissue manipulation    Soft-tissue mobilization was performed in the following areas:   Cervical paraspinal mm. bilateral and Levator Scap. right  Thoracic Paraspinal mm. right  Lumbar Paraspinal mm. bilateral and Gluteal mm. Glute. Max.  and Glute. Med. right      The patient tolerated today's treatment with little or no additional discomfort and was instructed to contact the office for questions or concerns.

## 2024-09-10 ENCOUNTER — APPOINTMENT (OUTPATIENT)
Dept: INTEGRATIVE MEDICINE | Facility: CLINIC | Age: 69
End: 2024-09-10
Payer: MEDICARE

## 2024-09-10 DIAGNOSIS — M99.04 SEGMENTAL AND SOMATIC DYSFUNCTION OF SACRAL REGION: ICD-10-CM

## 2024-09-10 DIAGNOSIS — R42 CERVICAL VERTIGO: ICD-10-CM

## 2024-09-10 DIAGNOSIS — M99.01 SEGMENTAL AND SOMATIC DYSFUNCTION OF CERVICAL REGION: Primary | ICD-10-CM

## 2024-09-10 DIAGNOSIS — M99.02 SEGMENTAL AND SOMATIC DYSFUNCTION OF THORACIC REGION: ICD-10-CM

## 2024-09-10 DIAGNOSIS — M79.10 MYALGIA: ICD-10-CM

## 2024-09-10 DIAGNOSIS — M54.2 CERVICALGIA: ICD-10-CM

## 2024-09-10 DIAGNOSIS — M25.551 CHRONIC HIP PAIN, BILATERAL: ICD-10-CM

## 2024-09-10 DIAGNOSIS — G89.29 CHRONIC HIP PAIN, BILATERAL: ICD-10-CM

## 2024-09-10 DIAGNOSIS — M25.552 CHRONIC HIP PAIN, BILATERAL: ICD-10-CM

## 2024-09-10 DIAGNOSIS — M99.03 SEGMENTAL AND SOMATIC DYSFUNCTION OF LUMBAR REGION: ICD-10-CM

## 2024-09-10 PROCEDURE — 98941 CHIROPRACT MANJ 3-4 REGIONS: CPT | Performed by: CHIROPRACTOR

## 2024-09-10 NOTE — PROGRESS NOTES
Subjective   Patient ID: Saumya Sutton is a 69 y.o. female who presents September 10, 2024 for neck and low back pain.    No limit-Medicare    Today, the patient rates their degree of pain as a 3 out of 10 on the numeric pain rating scale.     Saumya Nicole returns for treatment of vertigo. She states that she has had mild improvement in vertigo symptoms. She states that she continues to have dizziness while changing positions and rolling over in bed. Onemo-Hallpike was negative for dizziness or nystagmus. She states that she also has increased low back/hip pain, R>L.  Denies any associated symptoms or change in medical history since last visit and will follow up in 1 week.            Objective   Physical Exam  Constitutional:       General: She is not in acute distress.     Appearance: Normal appearance.       HENT:      Head: Normocephalic and atraumatic.   Musculoskeletal:      Cervical back: Decreased range of motion.      Lumbar back: Tenderness present. Decreased range of motion.   Neurological:      General: No focal deficit present.      Mental Status: She is alert and oriented to person, place, and time.      Cranial Nerves: No dysarthria or facial asymmetry.      Sensory: Sensation is intact.      Motor: Motor function is intact.      Coordination: Coordination is intact.      Gait: Gait is intact.   Psychiatric:         Attention and Perception: Attention normal.         Mood and Affect: Mood normal.         Speech: Speech normal.         Behavior: Behavior is cooperative.         Palpation of the following region(s) revealed:  Cervical: Levator scapulae right, hypertonicity and tenderness.  Cervical paraspinals bilateral, muscular hypertonicity.  Thoracic: Thoracic paraspinals right, hypertonicity and tenderness.  Lumbar: Lumbar paraspinals bilateral, hypertonicity and tenderness.  Gluteal right, hypertonicity and tenderness.        Segmental Joint(s): Segmental joint dysfunction was assessed with motion  palpation and is identified in the following areas:  Cervical : C1 C2 C4 C6 C7  Thoracic : T1, T4, T5, T6, and T9  Lumbopelvic / Sacral SIJ : L4, L5, L5/S1, R SIJ, and L SIJ  Upper / Lower Extremities : R Hip, L Hip, R Ankle, L Ankle, R Fibular head, and L Fibular head       Assessment/Plan   Today's Treatment Included: Chiropractic manipulation to the Segmental Joint(s) Cervical : C1 C2 C4 C6 C7 Segmental Joint(s) Lumbopelvic/Sacral SIJ : L4, L5, L5/S1, R SIJ, and L SIJ Segmental Joint(s) Thoracic : T1, T4, T5, T6, and T9 Segmental Joints Upper/Lower Extremities : R Hip, L Hip, R Ankle, L Ankle, R Fibular head, and L Fibular head  Treatment Techniques Used : Pelvic drop table technique, Manual traction, and Low force  Soft-Tissue manipulation    Soft-tissue mobilization was performed in the following areas:   Cervical paraspinal mm. bilateral and Levator Scap. right  Thoracic Paraspinal mm. right  Lumbar Paraspinal mm. bilateral and Gluteal mm. Glute. Max.  and Glute. Med. right      The patient tolerated today's treatment with little or no additional discomfort and was instructed to contact the office for questions or concerns.

## 2024-09-12 ENCOUNTER — APPOINTMENT (OUTPATIENT)
Dept: INTEGRATIVE MEDICINE | Facility: CLINIC | Age: 69
End: 2024-09-12
Payer: MEDICARE

## 2024-09-17 ENCOUNTER — APPOINTMENT (OUTPATIENT)
Dept: INTEGRATIVE MEDICINE | Facility: CLINIC | Age: 69
End: 2024-09-17
Payer: MEDICARE

## 2024-09-25 ENCOUNTER — APPOINTMENT (OUTPATIENT)
Dept: INTEGRATIVE MEDICINE | Facility: CLINIC | Age: 69
End: 2024-09-25
Payer: MEDICARE

## 2024-09-25 DIAGNOSIS — M25.551 CHRONIC HIP PAIN, BILATERAL: ICD-10-CM

## 2024-09-25 DIAGNOSIS — M54.2 CERVICALGIA: ICD-10-CM

## 2024-09-25 DIAGNOSIS — M99.03 SEGMENTAL AND SOMATIC DYSFUNCTION OF LUMBAR REGION: ICD-10-CM

## 2024-09-25 DIAGNOSIS — M25.552 CHRONIC HIP PAIN, BILATERAL: ICD-10-CM

## 2024-09-25 DIAGNOSIS — M79.10 MYALGIA: ICD-10-CM

## 2024-09-25 DIAGNOSIS — M99.01 SEGMENTAL AND SOMATIC DYSFUNCTION OF CERVICAL REGION: Primary | ICD-10-CM

## 2024-09-25 DIAGNOSIS — G89.29 CHRONIC HIP PAIN, BILATERAL: ICD-10-CM

## 2024-09-25 DIAGNOSIS — M99.04 SEGMENTAL AND SOMATIC DYSFUNCTION OF SACRAL REGION: ICD-10-CM

## 2024-09-25 DIAGNOSIS — M99.02 SEGMENTAL AND SOMATIC DYSFUNCTION OF THORACIC REGION: ICD-10-CM

## 2024-09-25 DIAGNOSIS — R42 CERVICAL VERTIGO: ICD-10-CM

## 2024-09-25 PROCEDURE — 98941 CHIROPRACT MANJ 3-4 REGIONS: CPT | Performed by: CHIROPRACTOR

## 2024-09-25 NOTE — PROGRESS NOTES
Subjective   Patient ID: Saumya Sutton is a 69 y.o. female who presents September 25, 2024 for neck and low back pain.    No limit-Medicare    Today, the patient rates their degree of pain as a 3 out of 10 on the numeric pain rating scale.     Saumya Nicole returns for treatment of vertigo and hip pain. She states that she continues to note improvement in dizziness. She states that she mostly has vertigo when rolling over in bed. She states that she has much less dizziness changing positions. She states that hip pain has improved as well.  Denies any associated symptoms or change in medical history since last visit and will follow up in 1 week.            Objective   Physical Exam  Constitutional:       General: She is not in acute distress.     Appearance: Normal appearance.       HENT:      Head: Normocephalic and atraumatic.   Musculoskeletal:      Cervical back: Decreased range of motion.      Lumbar back: Tenderness present. Decreased range of motion.   Neurological:      General: No focal deficit present.      Mental Status: She is alert and oriented to person, place, and time.      Cranial Nerves: No dysarthria or facial asymmetry.      Sensory: Sensation is intact.      Motor: Motor function is intact.      Coordination: Coordination is intact.      Gait: Gait is intact.   Psychiatric:         Attention and Perception: Attention normal.         Mood and Affect: Mood normal.         Speech: Speech normal.         Behavior: Behavior is cooperative.         Palpation of the following region(s) revealed:  Cervical: Levator scapulae right, hypertonicity and tenderness.  Cervical paraspinals bilateral, muscular hypertonicity.  Thoracic: Thoracic paraspinals right, hypertonicity and tenderness.  Lumbar: Lumbar paraspinals bilateral, hypertonicity and tenderness.  Gluteal right, hypertonicity and tenderness.        Segmental Joint(s): Segmental joint dysfunction was assessed with motion palpation and is identified in  the following areas:  Cervical : C1 C2 C4 C6 C7  Thoracic : T1, T4, T5, T6, and T9  Lumbopelvic / Sacral SIJ : L4, L5, L5/S1, R SIJ, and L SIJ  Upper / Lower Extremities : R Hip, L Hip, R Ankle, L Ankle, R Fibular head, and L Fibular head       Assessment/Plan   Today's Treatment Included: Chiropractic manipulation to the Segmental Joint(s) Cervical : C1 C2 C4 C6 C7 Segmental Joint(s) Lumbopelvic/Sacral SIJ : L4, L5, L5/S1, R SIJ, and L SIJ Segmental Joint(s) Thoracic : T1, T4, T5, T6, and T9 Segmental Joints Upper/Lower Extremities : R Hip, L Hip, R Ankle, L Ankle, R Fibular head, and L Fibular head  Treatment Techniques Used : Pelvic drop table technique, Manual traction, and Low force  Soft-Tissue manipulation    Soft-tissue mobilization was performed in the following areas:   Cervical paraspinal mm. bilateral and Levator Scap. right  Thoracic Paraspinal mm. right  Lumbar Paraspinal mm. bilateral and Gluteal mm. Glute. Max.  and Glute. Med. right      The patient tolerated today's treatment with little or no additional discomfort and was instructed to contact the office for questions or concerns.

## 2024-10-10 ENCOUNTER — APPOINTMENT (OUTPATIENT)
Dept: INTEGRATIVE MEDICINE | Facility: CLINIC | Age: 69
End: 2024-10-10
Payer: MEDICARE

## 2024-10-18 ENCOUNTER — APPOINTMENT (OUTPATIENT)
Dept: INTEGRATIVE MEDICINE | Facility: CLINIC | Age: 69
End: 2024-10-18
Payer: MEDICARE

## 2024-10-18 DIAGNOSIS — M54.2 CERVICALGIA: ICD-10-CM

## 2024-10-18 DIAGNOSIS — M99.02 SEGMENTAL AND SOMATIC DYSFUNCTION OF THORACIC REGION: ICD-10-CM

## 2024-10-18 DIAGNOSIS — M25.552 CHRONIC HIP PAIN, BILATERAL: ICD-10-CM

## 2024-10-18 DIAGNOSIS — M99.04 SEGMENTAL AND SOMATIC DYSFUNCTION OF SACRAL REGION: ICD-10-CM

## 2024-10-18 DIAGNOSIS — M99.03 SEGMENTAL AND SOMATIC DYSFUNCTION OF LUMBAR REGION: ICD-10-CM

## 2024-10-18 DIAGNOSIS — M79.10 MYALGIA: ICD-10-CM

## 2024-10-18 DIAGNOSIS — M99.01 SEGMENTAL AND SOMATIC DYSFUNCTION OF CERVICAL REGION: Primary | ICD-10-CM

## 2024-10-18 DIAGNOSIS — M25.551 CHRONIC HIP PAIN, BILATERAL: ICD-10-CM

## 2024-10-18 DIAGNOSIS — G89.29 CHRONIC HIP PAIN, BILATERAL: ICD-10-CM

## 2024-10-18 PROCEDURE — 98941 CHIROPRACT MANJ 3-4 REGIONS: CPT | Performed by: CHIROPRACTOR

## 2024-10-18 NOTE — PROGRESS NOTES
Subjective   Patient ID: Saumya Sutton is a 69 y.o. female who presents October 18, 2024 for neck and low back pain.    No limit-Medicare    Today, the patient rates their degree of pain as a 3 out of 10 on the numeric pain rating scale.     Saumya Nicole returns for treatment of vertigo and hip pain. She states that dizziness has mostly dissipated. She states that she cancelled her vestibular therapy appointment because she was feeling better, but then had a mild flare up and rescheduled for next week. Denies any associated symptoms or change in medical history since last visit and will follow up in 1 week. She states that she has lateral right hip pain that began a few days ago upon waking. She states that she has been doing more hipe flexor stretches and thinks that could have contributed to her symptoms. Denies any other associated symptoms or change in medical history since last visit and will follow up in 3 weeks.            Objective   Physical Exam  Constitutional:       General: She is not in acute distress.     Appearance: Normal appearance.       HENT:      Head: Normocephalic and atraumatic.   Musculoskeletal:      Cervical back: Decreased range of motion.      Lumbar back: Tenderness present. Decreased range of motion.   Neurological:      General: No focal deficit present.      Mental Status: She is alert and oriented to person, place, and time.      Cranial Nerves: No dysarthria or facial asymmetry.      Sensory: Sensation is intact.      Motor: Motor function is intact.      Coordination: Coordination is intact.      Gait: Gait is intact.   Psychiatric:         Attention and Perception: Attention normal.         Mood and Affect: Mood normal.         Speech: Speech normal.         Behavior: Behavior is cooperative.         Palpation of the following region(s) revealed:  Cervical: Levator scapulae right, hypertonicity and tenderness.  Cervical paraspinals bilateral, muscular hypertonicity.  Thoracic:  Thoracic paraspinals right, hypertonicity and tenderness.  Lumbar: Lumbar paraspinals bilateral, hypertonicity and tenderness.  Gluteal right, hypertonicity and tenderness.        Segmental Joint(s): Segmental joint dysfunction was assessed with motion palpation and is identified in the following areas:  Cervical : C1 C2 C4 C6 C7  Thoracic : T1, T4, T5, T6, and T9  Lumbopelvic / Sacral SIJ : L4, L5, L5/S1, R SIJ, and L SIJ  Upper / Lower Extremities : R Hip, L Hip, R Ankle, L Ankle, R Fibular head, and L Fibular head       Assessment/Plan   Today's Treatment Included: Chiropractic manipulation to the Segmental Joint(s) Cervical : C1 C2 C4 C6 C7 Segmental Joint(s) Lumbopelvic/Sacral SIJ : L4, L5, L5/S1, R SIJ, and L SIJ Segmental Joint(s) Thoracic : T1, T4, T5, T6, and T9 Segmental Joints Upper/Lower Extremities : R Hip, L Hip, R Ankle, L Ankle, R Fibular head, and L Fibular head  Treatment Techniques Used : Pelvic drop table technique, Manual traction, and Low force  Soft-Tissue manipulation    Soft-tissue mobilization was performed in the following areas:   Cervical paraspinal mm. bilateral and Levator Scap. right  Thoracic Paraspinal mm. right  Lumbar Paraspinal mm. bilateral and Gluteal mm. Glute. Max.  and Glute. Med. right      The patient tolerated today's treatment with little or no additional discomfort and was instructed to contact the office for questions or concerns.

## 2024-11-06 ENCOUNTER — APPOINTMENT (OUTPATIENT)
Dept: INTEGRATIVE MEDICINE | Facility: CLINIC | Age: 69
End: 2024-11-06
Payer: MEDICARE

## 2024-11-06 DIAGNOSIS — M25.551 CHRONIC HIP PAIN, BILATERAL: ICD-10-CM

## 2024-11-06 DIAGNOSIS — M99.03 SEGMENTAL AND SOMATIC DYSFUNCTION OF LUMBAR REGION: ICD-10-CM

## 2024-11-06 DIAGNOSIS — G89.29 CHRONIC HIP PAIN, BILATERAL: ICD-10-CM

## 2024-11-06 DIAGNOSIS — M99.04 SEGMENTAL AND SOMATIC DYSFUNCTION OF SACRAL REGION: ICD-10-CM

## 2024-11-06 DIAGNOSIS — M79.10 MYALGIA: ICD-10-CM

## 2024-11-06 DIAGNOSIS — M99.02 SEGMENTAL AND SOMATIC DYSFUNCTION OF THORACIC REGION: ICD-10-CM

## 2024-11-06 DIAGNOSIS — M54.2 CERVICALGIA: ICD-10-CM

## 2024-11-06 DIAGNOSIS — M99.01 SEGMENTAL AND SOMATIC DYSFUNCTION OF CERVICAL REGION: Primary | ICD-10-CM

## 2024-11-06 DIAGNOSIS — M25.552 CHRONIC HIP PAIN, BILATERAL: ICD-10-CM

## 2024-11-06 PROCEDURE — 98941 CHIROPRACT MANJ 3-4 REGIONS: CPT | Performed by: CHIROPRACTOR

## 2024-11-06 NOTE — PROGRESS NOTES
Subjective   Patient ID: Saumya Sutton is a 69 y.o. female who presents November 6, 2024 for upper back and low back/hip pain.    No limit-Medicare    Today, the patient rates their degree of pain as a 3 out of 10 on the numeric pain rating scale.     Saumya Nicole returns for treatment of upper back and bilateral hip pain. She states that she had an episode of dizziness this past Monday, but symptoms have mostly dissipated since then. She states that she has upper back discomfort today and mild low back, bilateral hip discomfort. Denies any other associated symptoms or change in medical history since last visit and will follow up in 3 weeks.            Objective   Physical Exam  Constitutional:       General: She is not in acute distress.     Appearance: Normal appearance.       HENT:      Head: Normocephalic and atraumatic.   Musculoskeletal:      Cervical back: Decreased range of motion.      Lumbar back: Tenderness present. Decreased range of motion.   Neurological:      General: No focal deficit present.      Mental Status: She is alert and oriented to person, place, and time.      Cranial Nerves: No dysarthria or facial asymmetry.      Sensory: Sensation is intact.      Motor: Motor function is intact.      Coordination: Coordination is intact.      Gait: Gait is intact.   Psychiatric:         Attention and Perception: Attention normal.         Mood and Affect: Mood normal.         Speech: Speech normal.         Behavior: Behavior is cooperative.         Palpation of the following region(s) revealed:  Cervical: Levator scapulae right, hypertonicity and tenderness.  Cervical paraspinals bilateral, muscular hypertonicity.  Thoracic: Thoracic paraspinals right, hypertonicity and tenderness.  Lumbar: Lumbar paraspinals bilateral, hypertonicity and tenderness.  Gluteal right, hypertonicity and tenderness.        Segmental Joint(s): Segmental joint dysfunction was assessed with motion palpation and is identified in the  following areas:  Cervical : C1 C2 C4 C6 C7  Thoracic : T1, T4, T5, T6, and T9  Lumbopelvic / Sacral SIJ : L4, L5, L5/S1, R SIJ, and L SIJ  Upper / Lower Extremities : R Hip, L Hip, R Ankle, L Ankle, R Fibular head, and L Fibular head       Assessment/Plan   Today's Treatment Included: Chiropractic manipulation to the Segmental Joint(s) Cervical : C1 C2 C4 C6 C7 Segmental Joint(s) Lumbopelvic/Sacral SIJ : L4, L5, L5/S1, R SIJ, and L SIJ Segmental Joint(s) Thoracic : T1, T4, T5, T6, and T9 Segmental Joints Upper/Lower Extremities : R Hip, L Hip, R Ankle, L Ankle, R Fibular head, and L Fibular head  Treatment Techniques Used : Pelvic drop table technique, Manual traction, and Low force  Soft-Tissue manipulation    Soft-tissue mobilization was performed in the following areas:   Cervical paraspinal mm. bilateral and Levator Scap. right  Thoracic Paraspinal mm. right  Lumbar Paraspinal mm. bilateral and Gluteal mm. Glute. Max.  and Glute. Med. right      The patient tolerated today's treatment with little or no additional discomfort and was instructed to contact the office for questions or concerns.

## 2024-11-27 ENCOUNTER — APPOINTMENT (OUTPATIENT)
Dept: INTEGRATIVE MEDICINE | Facility: CLINIC | Age: 69
End: 2024-11-27
Payer: MEDICARE

## 2024-11-27 DIAGNOSIS — M99.02 SEGMENTAL AND SOMATIC DYSFUNCTION OF THORACIC REGION: ICD-10-CM

## 2024-11-27 DIAGNOSIS — M54.2 CERVICALGIA: ICD-10-CM

## 2024-11-27 DIAGNOSIS — M79.10 MYALGIA: ICD-10-CM

## 2024-11-27 DIAGNOSIS — M25.551 CHRONIC HIP PAIN, BILATERAL: ICD-10-CM

## 2024-11-27 DIAGNOSIS — M99.01 SEGMENTAL AND SOMATIC DYSFUNCTION OF CERVICAL REGION: Primary | ICD-10-CM

## 2024-11-27 DIAGNOSIS — M25.552 CHRONIC HIP PAIN, BILATERAL: ICD-10-CM

## 2024-11-27 DIAGNOSIS — G89.29 CHRONIC HIP PAIN, BILATERAL: ICD-10-CM

## 2024-11-27 DIAGNOSIS — M99.04 SEGMENTAL AND SOMATIC DYSFUNCTION OF SACRAL REGION: ICD-10-CM

## 2024-11-27 DIAGNOSIS — M99.03 SEGMENTAL AND SOMATIC DYSFUNCTION OF LUMBAR REGION: ICD-10-CM

## 2024-11-27 PROCEDURE — 98941 CHIROPRACT MANJ 3-4 REGIONS: CPT | Performed by: CHIROPRACTOR

## 2024-11-27 NOTE — PROGRESS NOTES
Subjective   Patient ID: Saumya Sutton is a 69 y.o. female who presents November 27, 2024 for upper back and low back/hip pain.    No limit-Medicare    Today, the patient rates their degree of pain as a 2 out of 10 on the numeric pain rating scale.     Saumya Nicole returns for treatment of upper back and bilateral hip pain. She states that she has not had any significant episodes of dizziness since her last visit. She states that her neck and upper back have been stiff but not painful. She states that hip pain has improved. She states that her low back remains tight. Denies any other associated symptoms or change in medical history since last visit and will follow up in 3 weeks.            Objective   Physical Exam  Constitutional:       General: She is not in acute distress.     Appearance: Normal appearance.       HENT:      Head: Normocephalic and atraumatic.   Musculoskeletal:      Cervical back: Decreased range of motion.      Lumbar back: Tenderness present. Decreased range of motion.   Neurological:      General: No focal deficit present.      Mental Status: She is alert and oriented to person, place, and time.      Cranial Nerves: No dysarthria or facial asymmetry.      Sensory: Sensation is intact.      Motor: Motor function is intact.      Coordination: Coordination is intact.      Gait: Gait is intact.   Psychiatric:         Attention and Perception: Attention normal.         Mood and Affect: Mood normal.         Speech: Speech normal.         Behavior: Behavior is cooperative.         Palpation of the following region(s) revealed:  Cervical: Levator scapulae right, hypertonicity and tenderness.  Cervical paraspinals bilateral, muscular hypertonicity.  Thoracic: Thoracic paraspinals right, hypertonicity and tenderness.  Lumbar: Lumbar paraspinals bilateral, hypertonicity and tenderness.  Gluteal right, hypertonicity and tenderness.        Segmental Joint(s): Segmental joint dysfunction was assessed with  motion palpation and is identified in the following areas:  Cervical : C1 C2 C4 C6 C7  Thoracic : T1, T4, T5, T6, and T9  Lumbopelvic / Sacral SIJ : L4, L5, L5/S1, R SIJ, and L SIJ  Upper / Lower Extremities : R Hip, L Hip, R Ankle, L Ankle, R Fibular head, and L Fibular head       Assessment/Plan   Today's Treatment Included: Chiropractic manipulation to the Segmental Joint(s) Cervical : C1 C2 C4 C6 C7 Segmental Joint(s) Lumbopelvic/Sacral SIJ : L4, L5, L5/S1, R SIJ, and L SIJ Segmental Joint(s) Thoracic : T1, T4, T5, T6, and T9 Segmental Joints Upper/Lower Extremities : R Hip, L Hip, R Ankle, L Ankle, R Fibular head, and L Fibular head  Treatment Techniques Used : Pelvic drop table technique, Manual traction, and Low force  Soft-Tissue manipulation    Soft-tissue mobilization was performed in the following areas:   Cervical paraspinal mm. bilateral and Levator Scap. right  Thoracic Paraspinal mm. right  Lumbar Paraspinal mm. bilateral and Gluteal mm. Glute. Max.  and Glute. Med. right      The patient tolerated today's treatment with little or no additional discomfort and was instructed to contact the office for questions or concerns.

## 2024-12-06 ENCOUNTER — ALLIED HEALTH (OUTPATIENT)
Dept: INTEGRATIVE MEDICINE | Facility: CLINIC | Age: 69
End: 2024-12-06
Payer: MEDICARE

## 2024-12-06 DIAGNOSIS — M99.04 SEGMENTAL AND SOMATIC DYSFUNCTION OF SACRAL REGION: ICD-10-CM

## 2024-12-06 DIAGNOSIS — M25.551 CHRONIC HIP PAIN, BILATERAL: ICD-10-CM

## 2024-12-06 DIAGNOSIS — M54.2 CERVICALGIA: ICD-10-CM

## 2024-12-06 DIAGNOSIS — M99.02 SEGMENTAL AND SOMATIC DYSFUNCTION OF THORACIC REGION: ICD-10-CM

## 2024-12-06 DIAGNOSIS — M25.552 CHRONIC HIP PAIN, BILATERAL: ICD-10-CM

## 2024-12-06 DIAGNOSIS — G89.29 CHRONIC HIP PAIN, BILATERAL: ICD-10-CM

## 2024-12-06 DIAGNOSIS — M99.03 SEGMENTAL AND SOMATIC DYSFUNCTION OF LUMBAR REGION: ICD-10-CM

## 2024-12-06 DIAGNOSIS — M79.10 MYALGIA: ICD-10-CM

## 2024-12-06 DIAGNOSIS — M99.01 SEGMENTAL AND SOMATIC DYSFUNCTION OF CERVICAL REGION: Primary | ICD-10-CM

## 2024-12-06 PROCEDURE — 98941 CHIROPRACT MANJ 3-4 REGIONS: CPT | Performed by: CHIROPRACTOR

## 2024-12-06 NOTE — PROGRESS NOTES
Subjective   Patient ID: Saumya Sutton is a 69 y.o. female who presents December 6, 2024 for upper back and low back/hip pain.    No limit-Medicare    Today, the patient rates their degree of pain as a 4 out of 10 on the numeric pain rating scale.     Saumya Nicole returns for treatment of upper back and bilateral hip pain. She states that she has a flare up of left sided low back pain that began yesterday while emptying the . She states that the pain is local to the left side of the low back and buttock. She states that she rested and iced yesterday and that symptoms are slightly better today. Denies any other associated symptoms or change in medical history since last visit and will follow up in 2 weeks.            Objective   Physical Exam  Constitutional:       General: She is not in acute distress.     Appearance: Normal appearance.       HENT:      Head: Normocephalic and atraumatic.   Musculoskeletal:      Cervical back: Decreased range of motion.      Lumbar back: Tenderness present. Decreased range of motion.   Neurological:      General: No focal deficit present.      Mental Status: She is alert and oriented to person, place, and time.      Cranial Nerves: No dysarthria or facial asymmetry.      Sensory: Sensation is intact.      Motor: Motor function is intact.      Coordination: Coordination is intact.      Gait: Gait is intact.   Psychiatric:         Attention and Perception: Attention normal.         Mood and Affect: Mood normal.         Speech: Speech normal.         Behavior: Behavior is cooperative.       Palpation of the following region(s) revealed:  Cervical: Levator scapulae right, hypertonicity and tenderness.  Cervical paraspinals bilateral, muscular hypertonicity.  Thoracic: Thoracic paraspinals right, hypertonicity and tenderness.  Lumbar: Lumbar paraspinals bilateral, hypertonicity and tenderness.  Gluteal right, hypertonicity and tenderness.        Segmental Joint(s): Segmental  joint dysfunction was assessed with motion palpation and is identified in the following areas:  Cervical : C1 C2 C4 C6 C7  Thoracic : T1, T4, T5, T6, and T9  Lumbopelvic / Sacral SIJ : L4, L5, L5/S1, R SIJ, and L SIJ  Upper / Lower Extremities : R Hip, L Hip, R Ankle, L Ankle, R Fibular head, and L Fibular head       Assessment/Plan   Today's Treatment Included: Chiropractic manipulation to the Segmental Joint(s) Cervical : C1 C2 C4 C6 C7 Segmental Joint(s) Lumbopelvic/Sacral SIJ : L4, L5, L5/S1, R SIJ, and L SIJ Segmental Joint(s) Thoracic : T1, T4, T5, T6, and T9 Segmental Joints Upper/Lower Extremities : R Hip, L Hip, R Ankle, L Ankle, R Fibular head, and L Fibular head  Treatment Techniques Used : Pelvic drop table technique, Manual traction, and Low force  Soft-Tissue manipulation    Soft-tissue mobilization was performed in the following areas:   Cervical paraspinal mm. bilateral and Levator Scap. right  Thoracic Paraspinal mm. right  Lumbar Paraspinal mm. bilateral and Gluteal mm. Glute. Max.  and Glute. Med. right      The patient tolerated today's treatment with little or no additional discomfort and was instructed to contact the office for questions or concerns.

## 2024-12-18 ENCOUNTER — APPOINTMENT (OUTPATIENT)
Dept: INTEGRATIVE MEDICINE | Facility: CLINIC | Age: 69
End: 2024-12-18
Payer: MEDICARE

## 2024-12-20 ENCOUNTER — APPOINTMENT (OUTPATIENT)
Dept: INTEGRATIVE MEDICINE | Facility: CLINIC | Age: 69
End: 2024-12-20
Payer: MEDICARE

## 2024-12-20 DIAGNOSIS — M25.552 CHRONIC HIP PAIN, BILATERAL: ICD-10-CM

## 2024-12-20 DIAGNOSIS — M99.03 SEGMENTAL AND SOMATIC DYSFUNCTION OF LUMBAR REGION: ICD-10-CM

## 2024-12-20 DIAGNOSIS — M79.10 MYALGIA: ICD-10-CM

## 2024-12-20 DIAGNOSIS — M99.01 SEGMENTAL AND SOMATIC DYSFUNCTION OF CERVICAL REGION: Primary | ICD-10-CM

## 2024-12-20 DIAGNOSIS — M99.04 SEGMENTAL AND SOMATIC DYSFUNCTION OF SACRAL REGION: ICD-10-CM

## 2024-12-20 DIAGNOSIS — G89.29 CHRONIC HIP PAIN, BILATERAL: ICD-10-CM

## 2024-12-20 DIAGNOSIS — M25.551 CHRONIC HIP PAIN, BILATERAL: ICD-10-CM

## 2024-12-20 DIAGNOSIS — M54.2 CERVICALGIA: ICD-10-CM

## 2024-12-20 DIAGNOSIS — M99.02 SEGMENTAL AND SOMATIC DYSFUNCTION OF THORACIC REGION: ICD-10-CM

## 2024-12-20 PROCEDURE — 98941 CHIROPRACT MANJ 3-4 REGIONS: CPT | Performed by: CHIROPRACTOR

## 2024-12-20 NOTE — PROGRESS NOTES
Subjective   Patient ID: Saumya Sutton is a 69 y.o. female who presents December 20, 2024 for upper back and low back/hip pain.    No limit-Medicare    Today, the patient rates their degree of pain as a 2 out of 10 on the numeric pain rating scale.     Saumya Nicole returns for treatment of upper back and bilateral hip pain. She states that she is doing well today. She states that she has very mild left sided low back discomfort. She states that she had a twinge in her mid back a few days ago but those symptoms have dissipated. She states that she has had occasional vertigo while rolling over in bed. Denies any other associated symptoms or change in medical history since last visit and will follow up in 2 weeks.            Objective   Physical Exam  Constitutional:       General: She is not in acute distress.     Appearance: Normal appearance.       HENT:      Head: Normocephalic and atraumatic.   Musculoskeletal:      Cervical back: Decreased range of motion.      Lumbar back: Tenderness present. Decreased range of motion.   Neurological:      General: No focal deficit present.      Mental Status: She is alert and oriented to person, place, and time.      Cranial Nerves: No dysarthria or facial asymmetry.      Sensory: Sensation is intact.      Motor: Motor function is intact.      Coordination: Coordination is intact.      Gait: Gait is intact.   Psychiatric:         Attention and Perception: Attention normal.         Mood and Affect: Mood normal.         Speech: Speech normal.         Behavior: Behavior is cooperative.         Palpation of the following region(s) revealed:  Cervical: Levator scapulae right, hypertonicity and tenderness.  Cervical paraspinals bilateral, muscular hypertonicity.  Thoracic: Thoracic paraspinals right, hypertonicity and tenderness.  Lumbar: Lumbar paraspinals bilateral, hypertonicity and tenderness.  Gluteal right, hypertonicity and tenderness.        Segmental Joint(s): Segmental joint  dysfunction was assessed with motion palpation and is identified in the following areas:  Cervical : C1 C2 C4 C6 C7  Thoracic : T1, T4, T5, T6, and T9  Lumbopelvic / Sacral SIJ : L4, L5, L5/S1, R SIJ, and L SIJ  Upper / Lower Extremities : R Hip, L Hip, R Ankle, L Ankle, R Fibular head, and L Fibular head       Assessment/Plan   Today's Treatment Included: Chiropractic manipulation to the Segmental Joint(s) Cervical : C1 C2 C4 C6 C7 Segmental Joint(s) Lumbopelvic/Sacral SIJ : L4, L5, L5/S1, R SIJ, and L SIJ Segmental Joint(s) Thoracic : T1, T4, T5, T6, and T9 Segmental Joints Upper/Lower Extremities : R Hip, L Hip, R Ankle, L Ankle, R Fibular head, and L Fibular head  Treatment Techniques Used : Pelvic drop table technique, Manual traction, and Low force  Soft-Tissue manipulation    Soft-tissue mobilization was performed in the following areas:   Cervical paraspinal mm. bilateral and Levator Scap. right  Thoracic Paraspinal mm. right  Lumbar Paraspinal mm. bilateral and Gluteal mm. Glute. Max.  and Glute. Med. right      The patient tolerated today's treatment with little or no additional discomfort and was instructed to contact the office for questions or concerns.

## 2025-01-10 ENCOUNTER — APPOINTMENT (OUTPATIENT)
Dept: INTEGRATIVE MEDICINE | Facility: CLINIC | Age: 70
End: 2025-01-10
Payer: MEDICARE

## 2025-01-10 DIAGNOSIS — M99.04 SEGMENTAL AND SOMATIC DYSFUNCTION OF SACRAL REGION: ICD-10-CM

## 2025-01-10 DIAGNOSIS — M99.03 SEGMENTAL AND SOMATIC DYSFUNCTION OF LUMBAR REGION: ICD-10-CM

## 2025-01-10 DIAGNOSIS — M25.552 CHRONIC HIP PAIN, BILATERAL: ICD-10-CM

## 2025-01-10 DIAGNOSIS — M99.02 SEGMENTAL AND SOMATIC DYSFUNCTION OF THORACIC REGION: ICD-10-CM

## 2025-01-10 DIAGNOSIS — M25.551 CHRONIC HIP PAIN, BILATERAL: ICD-10-CM

## 2025-01-10 DIAGNOSIS — M54.2 CERVICALGIA: ICD-10-CM

## 2025-01-10 DIAGNOSIS — M99.01 SEGMENTAL AND SOMATIC DYSFUNCTION OF CERVICAL REGION: Primary | ICD-10-CM

## 2025-01-10 DIAGNOSIS — G89.29 CHRONIC HIP PAIN, BILATERAL: ICD-10-CM

## 2025-01-10 DIAGNOSIS — M79.10 MYALGIA: ICD-10-CM

## 2025-01-10 PROCEDURE — 98941 CHIROPRACT MANJ 3-4 REGIONS: CPT | Performed by: CHIROPRACTOR

## 2025-01-10 NOTE — PROGRESS NOTES
Subjective   Patient ID: Saumya Sutton is a 70 y.o. female who presents January 10, 2025 for upper back and low back/hip pain.    No limit-Medicare    Today, the patient rates their degree of pain as a 4 out of 10 on the numeric pain rating scale.     Saumya Nicole returns for treatment of upper back and bilateral hip pain. She states that she has increased bilateral hip/pelvis discomfort that began last week after eating processed food and have gut inflammation. She states that her gut symptoms have improved but pelvis and hips are still tight and painful. Denies any other associated symptoms or change in medical history since last visit and will follow up in 2 weeks.            Objective   Physical Exam  Constitutional:       General: She is not in acute distress.     Appearance: Normal appearance.       HENT:      Head: Normocephalic and atraumatic.   Musculoskeletal:      Cervical back: Decreased range of motion.      Lumbar back: Tenderness present. Decreased range of motion.   Neurological:      General: No focal deficit present.      Mental Status: She is alert and oriented to person, place, and time.      Cranial Nerves: No dysarthria or facial asymmetry.      Sensory: Sensation is intact.      Motor: Motor function is intact.      Coordination: Coordination is intact.      Gait: Gait is intact.   Psychiatric:         Attention and Perception: Attention normal.         Mood and Affect: Mood normal.         Speech: Speech normal.         Behavior: Behavior is cooperative.       Palpation of the following region(s) revealed:  Cervical: Levator scapulae right, hypertonicity and tenderness.  Cervical paraspinals bilateral, muscular hypertonicity.  Thoracic: Thoracic paraspinals right, hypertonicity and tenderness.  Lumbar: Lumbar paraspinals bilateral, hypertonicity and tenderness.  Gluteal right, hypertonicity and tenderness.        Segmental Joint(s): Segmental joint dysfunction was assessed with motion palpation  and is identified in the following areas:  Cervical : C1 C2 C4 C6 C7  Thoracic : T1, T4, T5, T6, and T9  Lumbopelvic / Sacral SIJ : L4, L5, L5/S1, R SIJ, and L SIJ  Upper / Lower Extremities : R Hip, L Hip, R Ankle, L Ankle, R Fibular head, and L Fibular head       Assessment/Plan   Today's Treatment Included: Chiropractic manipulation to the Segmental Joint(s) Cervical : C1 C2 C4 C6 C7 Segmental Joint(s) Lumbopelvic/Sacral SIJ : L4, L5, L5/S1, R SIJ, and L SIJ Segmental Joint(s) Thoracic : T1, T4, T5, T6, and T9 Segmental Joints Upper/Lower Extremities : R Hip, L Hip, R Ankle, L Ankle, R Fibular head, and L Fibular head  Treatment Techniques Used : Pelvic drop table technique, Manual traction, and Low force  Soft-Tissue manipulation    Soft-tissue mobilization was performed in the following areas:   Cervical paraspinal mm. bilateral and Levator Scap. right  Thoracic Paraspinal mm. right  Lumbar Paraspinal mm. bilateral and Gluteal mm. Glute. Max.  and Glute. Med. right      The patient tolerated today's treatment with little or no additional discomfort and was instructed to contact the office for questions or concerns.

## 2025-01-31 ENCOUNTER — APPOINTMENT (OUTPATIENT)
Dept: INTEGRATIVE MEDICINE | Facility: CLINIC | Age: 70
End: 2025-01-31
Payer: MEDICARE

## 2025-01-31 DIAGNOSIS — M25.551 CHRONIC HIP PAIN, BILATERAL: ICD-10-CM

## 2025-01-31 DIAGNOSIS — M25.552 CHRONIC HIP PAIN, BILATERAL: ICD-10-CM

## 2025-01-31 DIAGNOSIS — M99.04 SEGMENTAL AND SOMATIC DYSFUNCTION OF SACRAL REGION: ICD-10-CM

## 2025-01-31 DIAGNOSIS — M99.01 SEGMENTAL AND SOMATIC DYSFUNCTION OF CERVICAL REGION: Primary | ICD-10-CM

## 2025-01-31 DIAGNOSIS — M99.02 SEGMENTAL AND SOMATIC DYSFUNCTION OF THORACIC REGION: ICD-10-CM

## 2025-01-31 DIAGNOSIS — G89.29 CHRONIC HIP PAIN, BILATERAL: ICD-10-CM

## 2025-01-31 DIAGNOSIS — M99.03 SEGMENTAL AND SOMATIC DYSFUNCTION OF LUMBAR REGION: ICD-10-CM

## 2025-01-31 DIAGNOSIS — M54.2 CERVICALGIA: ICD-10-CM

## 2025-01-31 DIAGNOSIS — M79.10 MYALGIA: ICD-10-CM

## 2025-01-31 PROCEDURE — 98941 CHIROPRACT MANJ 3-4 REGIONS: CPT | Performed by: CHIROPRACTOR

## 2025-01-31 NOTE — PROGRESS NOTES
Subjective   Patient ID: Saumya Sutton is a 70 y.o. female who presents January 31, 2025 for upper back and low back/hip pain.    No limit-Medicare    Today, the patient rates their degree of pain as a 5 out of 10 on the numeric pain rating scale.     Saumya Nicole returns for treatment of upper back and bilateral hip pain. She states that she has increased neck/upper back symptoms that began with the colder weather. She states that she has increased leg discomfort as well. She states that she has not been going out or moving as much when the weather was very cold. She states that symptoms have improved slightly since the weather warmed up. Denies any other associated symptoms or change in medical history since last visit and will follow up in 2-4 weeks.            Objective   Physical Exam  Constitutional:       General: She is not in acute distress.     Appearance: Normal appearance.       HENT:      Head: Normocephalic and atraumatic.   Musculoskeletal:      Cervical back: Decreased range of motion.      Lumbar back: Tenderness present. Decreased range of motion.   Neurological:      General: No focal deficit present.      Mental Status: She is alert and oriented to person, place, and time.      Cranial Nerves: No dysarthria or facial asymmetry.      Sensory: Sensation is intact.      Motor: Motor function is intact.      Coordination: Coordination is intact.      Gait: Gait is intact.   Psychiatric:         Attention and Perception: Attention normal.         Mood and Affect: Mood normal.         Speech: Speech normal.         Behavior: Behavior is cooperative.         Palpation of the following region(s) revealed:  Cervical: Levator scapulae right, hypertonicity and tenderness.  Cervical paraspinals bilateral, muscular hypertonicity.  Thoracic: Thoracic paraspinals right, hypertonicity and tenderness.  Lumbar: Lumbar paraspinals bilateral, hypertonicity and tenderness.  Gluteal right, hypertonicity and  tenderness.        Segmental Joint(s): Segmental joint dysfunction was assessed with motion palpation and is identified in the following areas:  Cervical : C1 C2 C4 C6 C7  Thoracic : T1, T4, T5, T6, and T9  Lumbopelvic / Sacral SIJ : L4, L5, L5/S1, R SIJ, and L SIJ  Upper / Lower Extremities : R Hip, L Hip, R Ankle, L Ankle, R Fibular head, and L Fibular head       Assessment/Plan   Today's Treatment Included: Chiropractic manipulation to the Segmental Joint(s) Cervical : C1 C2 C4 C6 C7 Segmental Joint(s) Lumbopelvic/Sacral SIJ : L4, L5, L5/S1, R SIJ, and L SIJ Segmental Joint(s) Thoracic : T1, T4, T5, T6, and T9 Segmental Joints Upper/Lower Extremities : R Hip, L Hip, R Ankle, L Ankle, R Fibular head, and L Fibular head  Treatment Techniques Used : Pelvic drop table technique, Manual traction, and Low force  Soft-Tissue manipulation    Soft-tissue mobilization was performed in the following areas:   Cervical paraspinal mm. bilateral and Levator Scap. right  Thoracic Paraspinal mm. right  Lumbar Paraspinal mm. bilateral and Gluteal mm. Glute. Max.  and Glute. Med. right      The patient tolerated today's treatment with little or no additional discomfort and was instructed to contact the office for questions or concerns.

## 2025-02-21 ENCOUNTER — APPOINTMENT (OUTPATIENT)
Dept: INTEGRATIVE MEDICINE | Facility: CLINIC | Age: 70
End: 2025-02-21
Payer: MEDICARE

## 2025-02-21 DIAGNOSIS — G89.29 CHRONIC HIP PAIN, BILATERAL: ICD-10-CM

## 2025-02-21 DIAGNOSIS — M99.02 SEGMENTAL AND SOMATIC DYSFUNCTION OF THORACIC REGION: ICD-10-CM

## 2025-02-21 DIAGNOSIS — M79.10 MYALGIA: ICD-10-CM

## 2025-02-21 DIAGNOSIS — M25.552 CHRONIC HIP PAIN, BILATERAL: ICD-10-CM

## 2025-02-21 DIAGNOSIS — M99.04 SEGMENTAL AND SOMATIC DYSFUNCTION OF SACRAL REGION: ICD-10-CM

## 2025-02-21 DIAGNOSIS — M54.2 CERVICALGIA: ICD-10-CM

## 2025-02-21 DIAGNOSIS — M25.551 CHRONIC HIP PAIN, BILATERAL: ICD-10-CM

## 2025-02-21 DIAGNOSIS — M99.01 SEGMENTAL AND SOMATIC DYSFUNCTION OF CERVICAL REGION: Primary | ICD-10-CM

## 2025-02-21 DIAGNOSIS — M99.03 SEGMENTAL AND SOMATIC DYSFUNCTION OF LUMBAR REGION: ICD-10-CM

## 2025-02-21 PROCEDURE — 98941 CHIROPRACT MANJ 3-4 REGIONS: CPT | Performed by: CHIROPRACTOR

## 2025-02-21 NOTE — PROGRESS NOTES
Subjective   Patient ID: Saumya Sutton is a 70 y.o. female who presents February 21, 2025 for upper back and low back/hip pain.    No limit-Medicare    Today, the patient rates their degree of pain as a 3 out of 10 on the numeric pain rating scale.     Saumya Nicole returns for treatment of upper back and bilateral hip pain. She states that she is well today. She states that neck/upper back symptoms have improved slightly but have shifted to the left. She states that hip symptoms are slightly better but she continues to have lateral left thigh discomfort. Denies any other associated symptoms or change in medical history since last visit and will follow up in 2-4 weeks.            Objective   Physical Exam  Constitutional:       General: She is not in acute distress.     Appearance: Normal appearance.       HENT:      Head: Normocephalic and atraumatic.   Musculoskeletal:      Cervical back: Decreased range of motion.      Lumbar back: Tenderness present. Decreased range of motion.   Neurological:      General: No focal deficit present.      Mental Status: She is alert and oriented to person, place, and time.      Cranial Nerves: No dysarthria or facial asymmetry.      Sensory: Sensation is intact.      Motor: Motor function is intact.      Coordination: Coordination is intact.      Gait: Gait is intact.   Psychiatric:         Attention and Perception: Attention normal.         Mood and Affect: Mood normal.         Speech: Speech normal.         Behavior: Behavior is cooperative.         Palpation of the following region(s) revealed:  Cervical: Levator scapulae right, hypertonicity and tenderness.  Cervical paraspinals bilateral, muscular hypertonicity.  Thoracic: Thoracic paraspinals right, hypertonicity and tenderness.  Lumbar: Lumbar paraspinals bilateral, hypertonicity and tenderness.  Gluteal right, hypertonicity and tenderness.        Segmental Joint(s): Segmental joint dysfunction was assessed with motion  palpation and is identified in the following areas:  Cervical : C1 C2 C4 C6 C7  Thoracic : T1, T4, T5, T6, and T9  Lumbopelvic / Sacral SIJ : L4, L5, L5/S1, R SIJ, and L SIJ  Upper / Lower Extremities : R Hip, L Hip, R Ankle, L Ankle, R Fibular head, and L Fibular head       Assessment/Plan   Today's Treatment Included: Chiropractic manipulation to the Segmental Joint(s) Cervical : C1 C2 C4 C6 C7 Segmental Joint(s) Lumbopelvic/Sacral SIJ : L4, L5, L5/S1, R SIJ, and L SIJ Segmental Joint(s) Thoracic : T1, T4, T5, T6, and T9 Segmental Joints Upper/Lower Extremities : R Hip, L Hip, R Ankle, L Ankle, R Fibular head, and L Fibular head  Treatment Techniques Used : Pelvic drop table technique, Manual traction, and Low force  Soft-Tissue manipulation    Soft-tissue mobilization was performed in the following areas:   Cervical paraspinal mm. bilateral and Levator Scap. right  Thoracic Paraspinal mm. right  Lumbar Paraspinal mm. bilateral and Gluteal mm. Glute. Max.  and Glute. Med. right      The patient tolerated today's treatment with little or no additional discomfort and was instructed to contact the office for questions or concerns.

## 2025-03-14 ENCOUNTER — APPOINTMENT (OUTPATIENT)
Dept: INTEGRATIVE MEDICINE | Facility: CLINIC | Age: 70
End: 2025-03-14
Payer: MEDICARE

## 2025-03-14 DIAGNOSIS — M99.01 SEGMENTAL AND SOMATIC DYSFUNCTION OF CERVICAL REGION: Primary | ICD-10-CM

## 2025-03-14 DIAGNOSIS — M99.02 SEGMENTAL AND SOMATIC DYSFUNCTION OF THORACIC REGION: ICD-10-CM

## 2025-03-14 DIAGNOSIS — M54.2 CERVICALGIA: ICD-10-CM

## 2025-03-14 DIAGNOSIS — M25.552 CHRONIC HIP PAIN, BILATERAL: ICD-10-CM

## 2025-03-14 DIAGNOSIS — M99.04 SEGMENTAL AND SOMATIC DYSFUNCTION OF SACRAL REGION: ICD-10-CM

## 2025-03-14 DIAGNOSIS — G89.29 CHRONIC HIP PAIN, BILATERAL: ICD-10-CM

## 2025-03-14 DIAGNOSIS — M99.03 SEGMENTAL AND SOMATIC DYSFUNCTION OF LUMBAR REGION: ICD-10-CM

## 2025-03-14 DIAGNOSIS — M79.10 MYALGIA: ICD-10-CM

## 2025-03-14 DIAGNOSIS — M25.551 CHRONIC HIP PAIN, BILATERAL: ICD-10-CM

## 2025-03-14 PROCEDURE — 98941 CHIROPRACT MANJ 3-4 REGIONS: CPT | Performed by: CHIROPRACTOR

## 2025-03-14 NOTE — PROGRESS NOTES
Subjective   Patient ID: Saumya Sutton is a 70 y.o. female who presents March 14, 2025 for upper back and low back/hip pain.    No limit-Medicare    Today, the patient rates their degree of pain as a 3 out of 10 on the numeric pain rating scale.     Saumya Nicole returns for treatment of upper back and bilateral hip pain. She states that she is okay today. She states that low back/hip symptoms have increased slightly and she has been having a pinching sensation in her low back for the past few days. She states that she feels like her right leg is longer again. She states that she continues to have neck/upper back discomfort. Denies any other associated symptoms or change in medical history since last visit and will follow up in 2-4 weeks.            Objective   Physical Exam  Constitutional:       General: She is not in acute distress.     Appearance: Normal appearance.       HENT:      Head: Normocephalic and atraumatic.   Musculoskeletal:      Cervical back: Decreased range of motion.      Lumbar back: Tenderness present. Decreased range of motion.   Neurological:      General: No focal deficit present.      Mental Status: She is alert and oriented to person, place, and time.      Cranial Nerves: No dysarthria or facial asymmetry.      Sensory: Sensation is intact.      Motor: Motor function is intact.      Coordination: Coordination is intact.      Gait: Gait is intact.   Psychiatric:         Attention and Perception: Attention normal.         Mood and Affect: Mood normal.         Speech: Speech normal.         Behavior: Behavior is cooperative.       Palpation of the following region(s) revealed:  Cervical: Levator scapulae right, hypertonicity and tenderness.  Cervical paraspinals bilateral, muscular hypertonicity.  Thoracic: Thoracic paraspinals right, hypertonicity and tenderness.  Lumbar: Lumbar paraspinals bilateral, hypertonicity and tenderness.  Gluteal right, hypertonicity and tenderness.        Segmental  Joint(s): Segmental joint dysfunction was assessed with motion palpation and is identified in the following areas:  Cervical : C1 C2 C4 C6 C7  Thoracic : T1, T4, T5, T6, and T9  Lumbopelvic / Sacral SIJ : L4, L5, L5/S1, R SIJ, and L SIJ  Upper / Lower Extremities : R Hip, L Hip, R Ankle, L Ankle, R Fibular head, and L Fibular head       Assessment/Plan   Today's Treatment Included: Chiropractic manipulation to the Segmental Joint(s) Cervical : C1 C2 C4 C6 C7 Segmental Joint(s) Lumbopelvic/Sacral SIJ : L4, L5, L5/S1, R SIJ, and L SIJ Segmental Joint(s) Thoracic : T1, T4, T5, T6, and T9 Segmental Joints Upper/Lower Extremities : R Hip, L Hip, R Ankle, L Ankle, R Fibular head, and L Fibular head  Treatment Techniques Used : Pelvic drop table technique, Manual traction, and Low force  Soft-Tissue manipulation    Soft-tissue mobilization was performed in the following areas:   Cervical paraspinal mm. bilateral and Levator Scap. right  Thoracic Paraspinal mm. right  Lumbar Paraspinal mm. bilateral and Gluteal mm. Glute. Max.  and Glute. Med. right      The patient tolerated today's treatment with little or no additional discomfort and was instructed to contact the office for questions or concerns.

## 2025-04-02 ENCOUNTER — APPOINTMENT (OUTPATIENT)
Dept: INTEGRATIVE MEDICINE | Facility: CLINIC | Age: 70
End: 2025-04-02
Payer: MEDICARE

## 2025-04-02 DIAGNOSIS — M99.04 SEGMENTAL AND SOMATIC DYSFUNCTION OF SACRAL REGION: ICD-10-CM

## 2025-04-02 DIAGNOSIS — M99.01 SEGMENTAL AND SOMATIC DYSFUNCTION OF CERVICAL REGION: Primary | ICD-10-CM

## 2025-04-02 DIAGNOSIS — M54.2 CERVICALGIA: ICD-10-CM

## 2025-04-02 DIAGNOSIS — M25.552 CHRONIC HIP PAIN, BILATERAL: ICD-10-CM

## 2025-04-02 DIAGNOSIS — M99.02 SEGMENTAL AND SOMATIC DYSFUNCTION OF THORACIC REGION: ICD-10-CM

## 2025-04-02 DIAGNOSIS — M79.10 MYALGIA: ICD-10-CM

## 2025-04-02 DIAGNOSIS — M99.03 SEGMENTAL AND SOMATIC DYSFUNCTION OF LUMBAR REGION: ICD-10-CM

## 2025-04-02 DIAGNOSIS — M25.551 CHRONIC HIP PAIN, BILATERAL: ICD-10-CM

## 2025-04-02 DIAGNOSIS — G89.29 CHRONIC HIP PAIN, BILATERAL: ICD-10-CM

## 2025-04-02 PROCEDURE — 98941 CHIROPRACT MANJ 3-4 REGIONS: CPT | Performed by: CHIROPRACTOR

## 2025-04-02 NOTE — PROGRESS NOTES
Subjective   Patient ID: Saumya Sutton is a 70 y.o. female who presents April 2, 2025 for upper back and low back/hip pain.    No limit-Medicare    Today, the patient rates their degree of pain as a 2 out of 10 on the numeric pain rating scale.     Saumya Nicole returns for treatment of upper back and bilateral hip pain. She states that she is well today. She states that she has had less low back and hip pain. She states that she has left sided lateral thigh pain that goes down to just past the knee. She states that neck/upper back symptoms have been mild since last visit. Denies any other associated symptoms or change in medical history since last visit and will follow up in 2-4 weeks.            Objective   Physical Exam  Constitutional:       General: She is not in acute distress.     Appearance: Normal appearance.       HENT:      Head: Normocephalic and atraumatic.   Musculoskeletal:      Cervical back: Decreased range of motion.      Lumbar back: Tenderness present. Decreased range of motion.   Neurological:      General: No focal deficit present.      Mental Status: She is alert and oriented to person, place, and time.      Cranial Nerves: No dysarthria or facial asymmetry.      Sensory: Sensation is intact.      Motor: Motor function is intact.      Coordination: Coordination is intact.      Gait: Gait is intact.   Psychiatric:         Attention and Perception: Attention normal.         Mood and Affect: Mood normal.         Speech: Speech normal.         Behavior: Behavior is cooperative.         Palpation of the following region(s) revealed:  Cervical: Levator scapulae right, hypertonicity and tenderness.  Cervical paraspinals bilateral, muscular hypertonicity.  Thoracic: Thoracic paraspinals right, hypertonicity and tenderness.  Lumbar: Lumbar paraspinals bilateral, hypertonicity and tenderness.  Gluteal right, hypertonicity and tenderness.        Segmental Joint(s): Segmental joint dysfunction was assessed  with motion palpation and is identified in the following areas:  Cervical : C1 C2 C4 C6 C7  Thoracic : T1, T4, T5, T6, and T9  Lumbopelvic / Sacral SIJ : L4, L5, L5/S1, R SIJ, and L SIJ  Upper / Lower Extremities : R Hip, L Hip, R Ankle, L Ankle, R Fibular head, and L Fibular head       Assessment/Plan   Today's Treatment Included: Chiropractic manipulation to the Segmental Joint(s) Cervical : C1 C2 C4 C6 C7 Segmental Joint(s) Lumbopelvic/Sacral SIJ : L4, L5, L5/S1, R SIJ, and L SIJ Segmental Joint(s) Thoracic : T1, T4, T5, T6, and T9 Segmental Joints Upper/Lower Extremities : R Hip, L Hip, R Ankle, L Ankle, R Fibular head, and L Fibular head  Treatment Techniques Used : Pelvic drop table technique, Manual traction, and Low force  Soft-Tissue manipulation    Soft-tissue mobilization was performed in the following areas:   Cervical paraspinal mm. bilateral and Levator Scap. right  Thoracic Paraspinal mm. right  Lumbar Paraspinal mm. bilateral and Gluteal mm. Glute. Max.  and Glute. Med. right      The patient tolerated today's treatment with little or no additional discomfort and was instructed to contact the office for questions or concerns.

## 2025-04-23 ENCOUNTER — APPOINTMENT (OUTPATIENT)
Dept: INTEGRATIVE MEDICINE | Facility: CLINIC | Age: 70
End: 2025-04-23
Payer: MEDICARE

## 2025-04-23 DIAGNOSIS — M54.2 CERVICALGIA: ICD-10-CM

## 2025-04-23 DIAGNOSIS — M99.02 SEGMENTAL AND SOMATIC DYSFUNCTION OF THORACIC REGION: ICD-10-CM

## 2025-04-23 DIAGNOSIS — G89.29 CHRONIC HIP PAIN, BILATERAL: ICD-10-CM

## 2025-04-23 DIAGNOSIS — M79.10 MYALGIA: ICD-10-CM

## 2025-04-23 DIAGNOSIS — M99.03 SEGMENTAL AND SOMATIC DYSFUNCTION OF LUMBAR REGION: ICD-10-CM

## 2025-04-23 DIAGNOSIS — M99.04 SEGMENTAL AND SOMATIC DYSFUNCTION OF SACRAL REGION: ICD-10-CM

## 2025-04-23 DIAGNOSIS — M25.551 CHRONIC HIP PAIN, BILATERAL: ICD-10-CM

## 2025-04-23 DIAGNOSIS — M99.01 SEGMENTAL AND SOMATIC DYSFUNCTION OF CERVICAL REGION: Primary | ICD-10-CM

## 2025-04-23 DIAGNOSIS — M25.552 CHRONIC HIP PAIN, BILATERAL: ICD-10-CM

## 2025-04-23 PROCEDURE — 98941 CHIROPRACT MANJ 3-4 REGIONS: CPT | Performed by: CHIROPRACTOR

## 2025-04-23 NOTE — PROGRESS NOTES
Subjective   Patient ID: Saumya Sutton is a 70 y.o. female who presents April 23, 2025 for upper back and low back/hip pain.    No limit-Medicare    Today, the patient rates their degree of pain as a 3 out of 10 on the numeric pain rating scale.     Saumya Nicole returns for treatment of upper back and bilateral hip pain. She states that she is okay today. She states that she has slightly more left sided low back/buttock symptoms. She states that she has increased neck tension as well. She states that she cooked for the family on easter. Denies any other associated symptoms or change in medical history since last visit and will follow up in 2-4 weeks.            Objective   Physical Exam  Constitutional:       General: She is not in acute distress.     Appearance: Normal appearance.       HENT:      Head: Normocephalic and atraumatic.   Musculoskeletal:      Cervical back: Decreased range of motion.      Lumbar back: Tenderness present. Decreased range of motion.   Neurological:      General: No focal deficit present.      Mental Status: She is alert and oriented to person, place, and time.      Cranial Nerves: No dysarthria or facial asymmetry.      Sensory: Sensation is intact.      Motor: Motor function is intact.      Coordination: Coordination is intact.      Gait: Gait is intact.   Psychiatric:         Attention and Perception: Attention normal.         Mood and Affect: Mood normal.         Speech: Speech normal.         Behavior: Behavior is cooperative.         Palpation of the following region(s) revealed:  Cervical: Levator scapulae right, hypertonicity and tenderness.  Cervical paraspinals bilateral, muscular hypertonicity.  Thoracic: Thoracic paraspinals right, hypertonicity and tenderness.  Lumbar: Lumbar paraspinals bilateral, hypertonicity and tenderness.  Gluteal right, hypertonicity and tenderness.        Segmental Joint(s): Segmental joint dysfunction was assessed with motion palpation and is  identified in the following areas:  Cervical : C1 C2 C4 C6 C7  Thoracic : T1, T4, T5, T6, and T9  Lumbopelvic / Sacral SIJ : L4, L5, L5/S1, R SIJ, and L SIJ  Upper / Lower Extremities : R Hip, L Hip, R Ankle, L Ankle, R Fibular head, and L Fibular head       Assessment/Plan   Today's Treatment Included: Chiropractic manipulation to the Segmental Joint(s) Cervical : C1 C2 C4 C6 C7 Segmental Joint(s) Lumbopelvic/Sacral SIJ : L4, L5, L5/S1, R SIJ, and L SIJ Segmental Joint(s) Thoracic : T1, T4, T5, T6, and T9 Segmental Joints Upper/Lower Extremities : R Hip, L Hip, R Ankle, L Ankle, R Fibular head, and L Fibular head  Treatment Techniques Used : Pelvic drop table technique, Manual traction, and Low force  Soft-Tissue manipulation    Soft-tissue mobilization was performed in the following areas:   Cervical paraspinal mm. bilateral and Levator Scap. right  Thoracic Paraspinal mm. right  Lumbar Paraspinal mm. bilateral and Gluteal mm. Glute. Max.  and Glute. Med. right      The patient tolerated today's treatment with little or no additional discomfort and was instructed to contact the office for questions or concerns.

## 2025-05-07 ENCOUNTER — APPOINTMENT (OUTPATIENT)
Dept: INTEGRATIVE MEDICINE | Facility: CLINIC | Age: 70
End: 2025-05-07
Payer: MEDICARE

## 2025-05-28 ENCOUNTER — APPOINTMENT (OUTPATIENT)
Dept: INTEGRATIVE MEDICINE | Facility: CLINIC | Age: 70
End: 2025-05-28
Payer: MEDICARE

## 2025-05-28 DIAGNOSIS — M25.552 CHRONIC HIP PAIN, BILATERAL: ICD-10-CM

## 2025-05-28 DIAGNOSIS — M99.01 SEGMENTAL AND SOMATIC DYSFUNCTION OF CERVICAL REGION: Primary | ICD-10-CM

## 2025-05-28 DIAGNOSIS — G89.29 CHRONIC HIP PAIN, BILATERAL: ICD-10-CM

## 2025-05-28 DIAGNOSIS — M99.02 SEGMENTAL AND SOMATIC DYSFUNCTION OF THORACIC REGION: ICD-10-CM

## 2025-05-28 DIAGNOSIS — M54.2 CERVICALGIA: ICD-10-CM

## 2025-05-28 DIAGNOSIS — M79.10 MYALGIA: ICD-10-CM

## 2025-05-28 DIAGNOSIS — M99.04 SEGMENTAL AND SOMATIC DYSFUNCTION OF SACRAL REGION: ICD-10-CM

## 2025-05-28 DIAGNOSIS — M99.03 SEGMENTAL AND SOMATIC DYSFUNCTION OF LUMBAR REGION: ICD-10-CM

## 2025-05-28 DIAGNOSIS — M25.551 CHRONIC HIP PAIN, BILATERAL: ICD-10-CM

## 2025-05-28 PROCEDURE — 98941 CHIROPRACT MANJ 3-4 REGIONS: CPT | Performed by: CHIROPRACTOR

## 2025-05-28 NOTE — PROGRESS NOTES
Subjective   Patient ID: Saumya Sutton is a 70 y.o. female who presents May 28, 2025 for upper back and low back/hip pain.    No limit-Medicare    Today, the patient rates their degree of pain as a 4 out of 10 on the numeric pain rating scale.     Saumya Nicole returns for treatment of upper back and bilateral hip pain. She states that she is okay. She states that she has more left sided buttock and lateral thigh pain. She states that symptoms worsened a few days ago upon waking. She states that she has increased left knee and ankle pain as well. She states that neck and mid back symptoms have been mild. Denies any other associated symptoms or change in medical history since last visit and will follow up in 2-4 weeks.            Objective   Physical Exam  Constitutional:       General: She is not in acute distress.     Appearance: Normal appearance.       HENT:      Head: Normocephalic and atraumatic.   Musculoskeletal:      Cervical back: Decreased range of motion.      Lumbar back: Tenderness present. Decreased range of motion.   Neurological:      General: No focal deficit present.      Mental Status: She is alert and oriented to person, place, and time.      Cranial Nerves: No dysarthria or facial asymmetry.      Sensory: Sensation is intact.      Motor: Motor function is intact.      Coordination: Coordination is intact.      Gait: Gait is intact.   Psychiatric:         Attention and Perception: Attention normal.         Mood and Affect: Mood normal.         Speech: Speech normal.         Behavior: Behavior is cooperative.       Palpation of the following region(s) revealed:  Cervical: Levator scapulae right, hypertonicity and tenderness.  Cervical paraspinals bilateral, muscular hypertonicity.  Thoracic: Thoracic paraspinals right, hypertonicity and tenderness.  Lumbar: Lumbar paraspinals bilateral, hypertonicity and tenderness.  Gluteal right, hypertonicity and tenderness.        Segmental Joint(s): Segmental  joint dysfunction was assessed with motion palpation and is identified in the following areas:  Cervical : C1 C2 C4 C6 C7  Thoracic : T1, T4, T5, T6, and T9  Lumbopelvic / Sacral SIJ : L4, L5, L5/S1, R SIJ, and L SIJ  Upper / Lower Extremities : R Hip, L Hip, R Ankle, L Ankle, R Fibular head, and L Fibular head       Assessment/Plan   Today's Treatment Included: Chiropractic manipulation to the Segmental Joint(s) Cervical : C1 C2 C4 C6 C7 Segmental Joint(s) Lumbopelvic/Sacral SIJ : L4, L5, L5/S1, R SIJ, and L SIJ Segmental Joint(s) Thoracic : T1, T4, T5, T6, and T9 Segmental Joints Upper/Lower Extremities : R Hip, L Hip, R Ankle, L Ankle, R Fibular head, and L Fibular head  Treatment Techniques Used : Pelvic drop table technique, Manual traction, and Low force  Soft-Tissue manipulation    Soft-tissue mobilization was performed in the following areas:   Cervical paraspinal mm. bilateral and Levator Scap. right  Thoracic Paraspinal mm. right  Lumbar Paraspinal mm. bilateral and Gluteal mm. Glute. Max.  and Glute. Med. right      The patient tolerated today's treatment with little or no additional discomfort and was instructed to contact the office for questions or concerns.

## 2025-06-04 ENCOUNTER — ALLIED HEALTH (OUTPATIENT)
Dept: INTEGRATIVE MEDICINE | Facility: CLINIC | Age: 70
End: 2025-06-04
Payer: MEDICARE

## 2025-06-04 DIAGNOSIS — M99.04 SEGMENTAL AND SOMATIC DYSFUNCTION OF SACRAL REGION: ICD-10-CM

## 2025-06-04 DIAGNOSIS — M99.02 SEGMENTAL AND SOMATIC DYSFUNCTION OF THORACIC REGION: ICD-10-CM

## 2025-06-04 DIAGNOSIS — M54.2 CERVICALGIA: ICD-10-CM

## 2025-06-04 DIAGNOSIS — G89.29 CHRONIC HIP PAIN, BILATERAL: ICD-10-CM

## 2025-06-04 DIAGNOSIS — M25.551 CHRONIC HIP PAIN, BILATERAL: ICD-10-CM

## 2025-06-04 DIAGNOSIS — M79.10 MYALGIA: ICD-10-CM

## 2025-06-04 DIAGNOSIS — M99.03 SEGMENTAL AND SOMATIC DYSFUNCTION OF LUMBAR REGION: ICD-10-CM

## 2025-06-04 DIAGNOSIS — M99.01 SEGMENTAL AND SOMATIC DYSFUNCTION OF CERVICAL REGION: Primary | ICD-10-CM

## 2025-06-04 DIAGNOSIS — M25.552 CHRONIC HIP PAIN, BILATERAL: ICD-10-CM

## 2025-06-04 PROCEDURE — 98941 CHIROPRACT MANJ 3-4 REGIONS: CPT | Performed by: CHIROPRACTOR

## 2025-06-04 NOTE — PROGRESS NOTES
Subjective   Patient ID: Saumya Sutton is a 70 y.o. female who presents June 4, 2025 for upper back and low back/hip pain.    No limit-Medicare    Today, the patient rates their degree of pain as a 6 out of 10 on the numeric pain rating scale.     Saumya Nicole returns for treatment of upper back and bilateral hip pain. She states that she is okay. She states that she has increased left thigh pain. She states that symptoms began Sunday and have not changed since then. She doesn't recall any inciting event, but she has been rolling on a tennis ball on the wall for her glutes every day. Denies any other associated symptoms or change in medical history since last visit and will follow up in 2-4 weeks.            Objective   Physical Exam  Constitutional:       General: She is not in acute distress.     Appearance: Normal appearance.       HENT:      Head: Normocephalic and atraumatic.   Musculoskeletal:      Cervical back: Decreased range of motion.      Lumbar back: Tenderness present. Decreased range of motion.   Neurological:      General: No focal deficit present.      Mental Status: She is alert and oriented to person, place, and time.      Cranial Nerves: No dysarthria or facial asymmetry.      Sensory: Sensation is intact.      Motor: Motor function is intact.      Coordination: Coordination is intact.      Gait: Gait is intact.   Psychiatric:         Attention and Perception: Attention normal.         Mood and Affect: Mood normal.         Speech: Speech normal.         Behavior: Behavior is cooperative.       Palpation of the following region(s) revealed:  Cervical: Levator scapulae right, hypertonicity and tenderness.  Cervical paraspinals bilateral, muscular hypertonicity.  Thoracic: Thoracic paraspinals right, hypertonicity and tenderness.  Lumbar: Lumbar paraspinals bilateral, hypertonicity and tenderness.  Gluteal right, hypertonicity and tenderness.  Quads left, hypertonicity and tenderness.       Segmental  Joint(s): Segmental joint dysfunction was assessed with motion palpation and is identified in the following areas:  Cervical : C1 C2 C4 C6 C7  Thoracic : T1, T4, T5, T6, and T9  Lumbopelvic / Sacral SIJ : L4, L5, L5/S1, R SIJ, and L SIJ  Upper / Lower Extremities : R Hip, L Hip, R Ankle, L Ankle, R Fibular head, and L Fibular head       Assessment/Plan   Today's Treatment Included: Chiropractic manipulation to the Segmental Joint(s) Cervical : C1 C2 C4 C6 C7 Segmental Joint(s) Lumbopelvic/Sacral SIJ : L4, L5, L5/S1, R SIJ, and L SIJ Segmental Joint(s) Thoracic : T1, T4, T5, T6, and T9 Segmental Joints Upper/Lower Extremities : R Hip, L Hip, R Ankle, L Ankle, R Fibular head, and L Fibular head  Treatment Techniques Used : Pelvic drop table technique, Manual traction, and Low force  Soft-Tissue manipulation    Soft-tissue mobilization was performed in the following areas:   Cervical paraspinal mm. bilateral and Levator Scap. right  Thoracic Paraspinal mm. right  Lumbar Paraspinal mm. bilateral and Gluteal mm. Glute. Max.  and Glute. Med. right Quads left.      The patient tolerated today's treatment with little or no additional discomfort and was instructed to contact the office for questions or concerns.

## 2025-06-18 ENCOUNTER — APPOINTMENT (OUTPATIENT)
Dept: INTEGRATIVE MEDICINE | Facility: CLINIC | Age: 70
End: 2025-06-18
Payer: MEDICARE

## 2025-06-18 DIAGNOSIS — M25.552 CHRONIC HIP PAIN, BILATERAL: ICD-10-CM

## 2025-06-18 DIAGNOSIS — M79.10 MYALGIA: ICD-10-CM

## 2025-06-18 DIAGNOSIS — M99.01 SEGMENTAL AND SOMATIC DYSFUNCTION OF CERVICAL REGION: Primary | ICD-10-CM

## 2025-06-18 DIAGNOSIS — M54.2 CERVICALGIA: ICD-10-CM

## 2025-06-18 DIAGNOSIS — G89.29 CHRONIC HIP PAIN, BILATERAL: ICD-10-CM

## 2025-06-18 DIAGNOSIS — M99.04 SEGMENTAL AND SOMATIC DYSFUNCTION OF SACRAL REGION: ICD-10-CM

## 2025-06-18 DIAGNOSIS — M99.03 SEGMENTAL AND SOMATIC DYSFUNCTION OF LUMBAR REGION: ICD-10-CM

## 2025-06-18 DIAGNOSIS — M25.551 CHRONIC HIP PAIN, BILATERAL: ICD-10-CM

## 2025-06-18 DIAGNOSIS — M99.02 SEGMENTAL AND SOMATIC DYSFUNCTION OF THORACIC REGION: ICD-10-CM

## 2025-06-18 PROCEDURE — 98941 CHIROPRACT MANJ 3-4 REGIONS: CPT | Performed by: CHIROPRACTOR

## 2025-06-18 NOTE — PROGRESS NOTES
Subjective   Patient ID: Saumya Sutton is a 70 y.o. female who presents June 18, 2025 for upper back and low back/hip pain.    No limit-Medicare    Today, the patient rates their degree of pain as a 2 out of 10 on the numeric pain rating scale.     Saumya Nicole returns for treatment of upper back and bilateral hip pain. She states that she is well. She states that she had improvement in thigh symptoms after last visit. She states that she tried exercising her ankles and rolled them around. She states that she had ankle pain for about 5 days afterwards. She states that she did not do much activity and then she has not had any low back/thigh pain since them. Denies any other associated symptoms or change in medical history since last visit and will follow up in 2-4 weeks.            Objective   Physical Exam  Constitutional:       General: She is not in acute distress.     Appearance: Normal appearance.       HENT:      Head: Normocephalic and atraumatic.   Musculoskeletal:      Cervical back: Decreased range of motion.      Lumbar back: Tenderness present. Decreased range of motion.   Neurological:      General: No focal deficit present.      Mental Status: She is alert and oriented to person, place, and time.      Cranial Nerves: No dysarthria or facial asymmetry.      Sensory: Sensation is intact.      Motor: Motor function is intact.      Coordination: Coordination is intact.      Gait: Gait is intact.   Psychiatric:         Attention and Perception: Attention normal.         Mood and Affect: Mood normal.         Speech: Speech normal.         Behavior: Behavior is cooperative.       Palpation of the following region(s) revealed:  Cervical: Levator scapulae right, hypertonicity and tenderness.  Cervical paraspinals bilateral, muscular hypertonicity.  Thoracic: Thoracic paraspinals right, hypertonicity and tenderness.  Lumbar: Lumbar paraspinals bilateral, hypertonicity and tenderness.  Gluteal right, hypertonicity  and tenderness.  Quads left, hypertonicity and tenderness.       Segmental Joint(s): Segmental joint dysfunction was assessed with motion palpation and is identified in the following areas:  Cervical : C1 C2 C4 C6 C7  Thoracic : T1, T4, T5, T6, and T9  Lumbopelvic / Sacral SIJ : L4, L5, L5/S1, R SIJ, and L SIJ  Upper / Lower Extremities : R Hip, L Hip, R Ankle, L Ankle, R Fibular head, and L Fibular head       Assessment/Plan   Today's Treatment Included: Chiropractic manipulation to the Segmental Joint(s) Cervical : C1 C2 C4 C6 C7 Segmental Joint(s) Lumbopelvic/Sacral SIJ : L4, L5, L5/S1, R SIJ, and L SIJ Segmental Joint(s) Thoracic : T1, T4, T5, T6, and T9 Segmental Joints Upper/Lower Extremities : R Hip, L Hip, R Ankle, L Ankle, R Fibular head, and L Fibular head  Treatment Techniques Used : Pelvic drop table technique, Manual traction, and Low force  Soft-Tissue manipulation    Soft-tissue mobilization was performed in the following areas:   Cervical paraspinal mm. bilateral and Levator Scap. right  Thoracic Paraspinal mm. right  Lumbar Paraspinal mm. bilateral and Gluteal mm. Glute. Max.  and Glute. Med. right Quads left.      The patient tolerated today's treatment with little or no additional discomfort and was instructed to contact the office for questions or concerns.

## 2025-07-09 ENCOUNTER — APPOINTMENT (OUTPATIENT)
Dept: INTEGRATIVE MEDICINE | Facility: CLINIC | Age: 70
End: 2025-07-09
Payer: MEDICARE

## 2025-07-09 DIAGNOSIS — M99.01 SEGMENTAL AND SOMATIC DYSFUNCTION OF CERVICAL REGION: Primary | ICD-10-CM

## 2025-07-09 DIAGNOSIS — M25.551 CHRONIC HIP PAIN, BILATERAL: ICD-10-CM

## 2025-07-09 DIAGNOSIS — M54.2 CERVICALGIA: ICD-10-CM

## 2025-07-09 DIAGNOSIS — M99.04 SEGMENTAL AND SOMATIC DYSFUNCTION OF SACRAL REGION: ICD-10-CM

## 2025-07-09 DIAGNOSIS — M99.03 SEGMENTAL AND SOMATIC DYSFUNCTION OF LUMBAR REGION: ICD-10-CM

## 2025-07-09 DIAGNOSIS — G89.29 CHRONIC HIP PAIN, BILATERAL: ICD-10-CM

## 2025-07-09 DIAGNOSIS — M25.552 CHRONIC HIP PAIN, BILATERAL: ICD-10-CM

## 2025-07-09 DIAGNOSIS — M79.10 MYALGIA: ICD-10-CM

## 2025-07-09 DIAGNOSIS — M99.02 SEGMENTAL AND SOMATIC DYSFUNCTION OF THORACIC REGION: ICD-10-CM

## 2025-07-09 PROCEDURE — 98941 CHIROPRACT MANJ 3-4 REGIONS: CPT | Performed by: CHIROPRACTOR

## 2025-07-09 NOTE — PROGRESS NOTES
Subjective   Patient ID: Saumya Sutton is a 70 y.o. female who presents July 9, 2025 for upper back and low back/hip pain.    No limit-Medicare    Today, the patient rates their degree of pain as a 3 out of 10 on the numeric pain rating scale.     Saumya Nicole returns for treatment of upper back and bilateral hip pain. She states that she is okay today. She states that she has had increased full spine discomfort for the past two weeks. She states that she had been sitting in a wrought iron chair for several days and that could have helped increase symptoms. She states that increased symptoms have started to dissipate this morning. Denies any other associated symptoms or change in medical history since last visit and will follow up in 2-4 weeks.            Objective   Physical Exam  Constitutional:       General: She is not in acute distress.     Appearance: Normal appearance.       HENT:      Head: Normocephalic and atraumatic.   Musculoskeletal:      Cervical back: Decreased range of motion.      Lumbar back: Tenderness present. Decreased range of motion.   Neurological:      General: No focal deficit present.      Mental Status: She is alert and oriented to person, place, and time.      Cranial Nerves: No dysarthria or facial asymmetry.      Sensory: Sensation is intact.      Motor: Motor function is intact.      Coordination: Coordination is intact.      Gait: Gait is intact.   Psychiatric:         Attention and Perception: Attention normal.         Mood and Affect: Mood normal.         Speech: Speech normal.         Behavior: Behavior is cooperative.         Palpation of the following region(s) revealed:  Cervical: Levator scapulae right, hypertonicity and tenderness.  Cervical paraspinals bilateral, muscular hypertonicity.  Thoracic: Thoracic paraspinals right, hypertonicity and tenderness.  Lumbar: Lumbar paraspinals bilateral, hypertonicity and tenderness.  Gluteal right, hypertonicity and tenderness.  Quads  left, hypertonicity and tenderness.       Segmental Joint(s): Segmental joint dysfunction was assessed with motion palpation and is identified in the following areas:  Cervical : C1 C2 C4 C6 C7  Thoracic : T1, T4, T5, T6, and T9  Lumbopelvic / Sacral SIJ : L4, L5, L5/S1, R SIJ, and L SIJ  Upper / Lower Extremities : R Hip, L Hip, R Ankle, L Ankle, R Fibular head, and L Fibular head       Assessment/Plan   Today's Treatment Included: Chiropractic manipulation to the Segmental Joint(s) Cervical : C1 C2 C4 C6 C7 Segmental Joint(s) Lumbopelvic/Sacral SIJ : L4, L5, L5/S1, R SIJ, and L SIJ Segmental Joint(s) Thoracic : T1, T4, T5, T6, and T9 Segmental Joints Upper/Lower Extremities : R Hip, L Hip, R Ankle, L Ankle, R Fibular head, and L Fibular head  Treatment Techniques Used : Pelvic drop table technique, Manual traction, and Low force  Soft-Tissue manipulation    Soft-tissue mobilization was performed in the following areas:   Cervical paraspinal mm. bilateral and Levator Scap. right  Thoracic Paraspinal mm. right  Lumbar Paraspinal mm. bilateral and Gluteal mm. Glute. Max.  and Glute. Med. right Quads left.      The patient tolerated today's treatment with little or no additional discomfort and was instructed to contact the office for questions or concerns.

## 2025-08-06 ENCOUNTER — APPOINTMENT (OUTPATIENT)
Dept: INTEGRATIVE MEDICINE | Facility: CLINIC | Age: 70
End: 2025-08-06
Payer: MEDICARE

## 2025-08-06 DIAGNOSIS — M99.02 SEGMENTAL AND SOMATIC DYSFUNCTION OF THORACIC REGION: ICD-10-CM

## 2025-08-06 DIAGNOSIS — M54.2 CERVICALGIA: ICD-10-CM

## 2025-08-06 DIAGNOSIS — M79.10 MYALGIA: ICD-10-CM

## 2025-08-06 DIAGNOSIS — M25.551 CHRONIC HIP PAIN, BILATERAL: ICD-10-CM

## 2025-08-06 DIAGNOSIS — M99.04 SEGMENTAL AND SOMATIC DYSFUNCTION OF SACRAL REGION: ICD-10-CM

## 2025-08-06 DIAGNOSIS — M99.03 SEGMENTAL AND SOMATIC DYSFUNCTION OF LUMBAR REGION: ICD-10-CM

## 2025-08-06 DIAGNOSIS — M25.552 CHRONIC HIP PAIN, BILATERAL: ICD-10-CM

## 2025-08-06 DIAGNOSIS — M99.01 SEGMENTAL AND SOMATIC DYSFUNCTION OF CERVICAL REGION: Primary | ICD-10-CM

## 2025-08-06 DIAGNOSIS — G89.29 CHRONIC HIP PAIN, BILATERAL: ICD-10-CM

## 2025-08-06 PROCEDURE — 98941 CHIROPRACT MANJ 3-4 REGIONS: CPT | Performed by: CHIROPRACTOR

## 2025-08-06 NOTE — PROGRESS NOTES
Subjective   Patient ID: Saumya Sutton is a 70 y.o. female who presents August 6, 2025 for upper back and low back/hip pain.    No limit-Medicare    Today, the patient rates their degree of pain as a 2 out of 10 on the numeric pain rating scale.     Saumya Nicole returns for treatment of upper back and bilateral hip pain. She states that she is well. She states that she has been having left sided upper back discomfort for the past week or so that started without incident. She states that low back symptoms have been mild since last visit. She states that she has had more stress recently fixing up her mother's house. Denies any other associated symptoms or change in medical history since last visit and will follow up in 2-4 weeks.            Objective   Physical Exam  Constitutional:       General: She is not in acute distress.     Appearance: Normal appearance.     HENT:      Head: Normocephalic and atraumatic.     Musculoskeletal:      Cervical back: Decreased range of motion.      Lumbar back: Tenderness present. Decreased range of motion.     Neurological:      General: No focal deficit present.      Mental Status: She is alert and oriented to person, place, and time.      Cranial Nerves: No dysarthria or facial asymmetry.      Sensory: Sensation is intact.      Motor: Motor function is intact.      Coordination: Coordination is intact.      Gait: Gait is intact.     Psychiatric:         Attention and Perception: Attention normal.         Mood and Affect: Mood normal.         Speech: Speech normal.         Behavior: Behavior is cooperative.         Palpation of the following region(s) revealed:  Cervical: Levator scapulae right, hypertonicity and tenderness.  Cervical paraspinals bilateral, muscular hypertonicity.  Thoracic: Thoracic paraspinals right, hypertonicity and tenderness.  Lumbar: Lumbar paraspinals bilateral, hypertonicity and tenderness.  Gluteal right, hypertonicity and tenderness.        Segmental  Joint(s): Segmental joint dysfunction was assessed with motion palpation and is identified in the following areas:  Cervical : C1 C2 C4 C6 C7  Thoracic : T1, T4, T5, T6, and T9  Lumbopelvic / Sacral SIJ : L4, L5, L5/S1, R SIJ, and L SIJ  Upper / Lower Extremities : R Hip, L Hip, R Ankle, L Ankle, R Fibular head, and L Fibular head       Assessment/Plan   Today's Treatment Included: Chiropractic manipulation to the Segmental Joint(s) Cervical : C1 C2 C4 C6 C7 Segmental Joint(s) Lumbopelvic/Sacral SIJ : L4, L5, L5/S1, R SIJ, and L SIJ Segmental Joint(s) Thoracic : T1, T4, T5, T6, and T9 Segmental Joints Upper/Lower Extremities : R Hip, L Hip, R Ankle, L Ankle, R Fibular head, and L Fibular head  Treatment Techniques Used : Pelvic drop table technique, Manual traction, and Low force  Soft-Tissue manipulation    Soft-tissue mobilization was performed in the following areas:   Cervical paraspinal mm. bilateral and Levator Scap. right  Thoracic Paraspinal mm. right  Lumbar Paraspinal mm. bilateral and Gluteal mm. Glute. Max.  and Glute. Med. right       The patient tolerated today's treatment with little or no additional discomfort and was instructed to contact the office for questions or concerns.

## 2025-08-27 ENCOUNTER — APPOINTMENT (OUTPATIENT)
Dept: INTEGRATIVE MEDICINE | Facility: CLINIC | Age: 70
End: 2025-08-27
Payer: MEDICARE

## 2025-08-27 DIAGNOSIS — M99.03 SEGMENTAL AND SOMATIC DYSFUNCTION OF LUMBAR REGION: ICD-10-CM

## 2025-08-27 DIAGNOSIS — M25.551 CHRONIC HIP PAIN, BILATERAL: ICD-10-CM

## 2025-08-27 DIAGNOSIS — M99.04 SEGMENTAL AND SOMATIC DYSFUNCTION OF SACRAL REGION: ICD-10-CM

## 2025-08-27 DIAGNOSIS — M54.2 CERVICALGIA: ICD-10-CM

## 2025-08-27 DIAGNOSIS — G89.29 CHRONIC HIP PAIN, BILATERAL: ICD-10-CM

## 2025-08-27 DIAGNOSIS — M99.01 SEGMENTAL AND SOMATIC DYSFUNCTION OF CERVICAL REGION: Primary | ICD-10-CM

## 2025-08-27 DIAGNOSIS — M99.02 SEGMENTAL AND SOMATIC DYSFUNCTION OF THORACIC REGION: ICD-10-CM

## 2025-08-27 DIAGNOSIS — M79.10 MYALGIA: ICD-10-CM

## 2025-08-27 DIAGNOSIS — M25.552 CHRONIC HIP PAIN, BILATERAL: ICD-10-CM

## 2025-08-27 PROCEDURE — 98941 CHIROPRACT MANJ 3-4 REGIONS: CPT | Performed by: CHIROPRACTOR

## 2025-09-17 ENCOUNTER — APPOINTMENT (OUTPATIENT)
Dept: INTEGRATIVE MEDICINE | Facility: CLINIC | Age: 70
End: 2025-09-17
Payer: MEDICARE

## 2025-10-08 ENCOUNTER — APPOINTMENT (OUTPATIENT)
Dept: INTEGRATIVE MEDICINE | Facility: CLINIC | Age: 70
End: 2025-10-08
Payer: MEDICARE

## 2025-10-29 ENCOUNTER — APPOINTMENT (OUTPATIENT)
Dept: INTEGRATIVE MEDICINE | Facility: CLINIC | Age: 70
End: 2025-10-29
Payer: MEDICARE

## 2025-11-19 ENCOUNTER — APPOINTMENT (OUTPATIENT)
Dept: INTEGRATIVE MEDICINE | Facility: CLINIC | Age: 70
End: 2025-11-19
Payer: MEDICARE